# Patient Record
Sex: MALE | Race: WHITE | Employment: OTHER | URBAN - METROPOLITAN AREA
[De-identification: names, ages, dates, MRNs, and addresses within clinical notes are randomized per-mention and may not be internally consistent; named-entity substitution may affect disease eponyms.]

---

## 2019-02-09 LAB
AVERAGE GLUCOSE: NORMAL
AVERAGE GLUCOSE: NORMAL
HBA1C MFR BLD: 9.7 %
HBA1C MFR BLD: 9.7 %

## 2019-03-07 ENCOUNTER — OFFICE VISIT (OUTPATIENT)
Dept: SURGERY | Age: 49
End: 2019-03-07
Payer: COMMERCIAL

## 2019-03-07 VITALS
WEIGHT: 211 LBS | OXYGEN SATURATION: 97 % | BODY MASS INDEX: 33.12 KG/M2 | HEART RATE: 97 BPM | HEIGHT: 67 IN | TEMPERATURE: 98.6 F | SYSTOLIC BLOOD PRESSURE: 140 MMHG | DIASTOLIC BLOOD PRESSURE: 80 MMHG

## 2019-03-07 DIAGNOSIS — Z12.11 ENCOUNTER FOR SCREENING COLONOSCOPY: Primary | ICD-10-CM

## 2019-03-07 PROCEDURE — G8417 CALC BMI ABV UP PARAM F/U: HCPCS | Performed by: SURGERY

## 2019-03-07 PROCEDURE — 1036F TOBACCO NON-USER: CPT | Performed by: SURGERY

## 2019-03-07 PROCEDURE — 99203 OFFICE O/P NEW LOW 30 MIN: CPT | Performed by: SURGERY

## 2019-03-07 PROCEDURE — G8427 DOCREV CUR MEDS BY ELIG CLIN: HCPCS | Performed by: SURGERY

## 2019-03-07 PROCEDURE — G8484 FLU IMMUNIZE NO ADMIN: HCPCS | Performed by: SURGERY

## 2019-03-07 RX ORDER — ATORVASTATIN CALCIUM 40 MG/1
40 TABLET, FILM COATED ORAL DAILY
COMMUNITY
Start: 2018-12-19 | End: 2020-03-12 | Stop reason: SDUPTHER

## 2019-03-11 ENCOUNTER — TELEPHONE (OUTPATIENT)
Dept: SURGERY | Age: 49
End: 2019-03-11

## 2019-03-12 ENCOUNTER — TELEPHONE (OUTPATIENT)
Dept: SURGERY | Age: 49
End: 2019-03-12

## 2019-03-13 NOTE — TELEPHONE ENCOUNTER
Called patient and he stated that she needs to change his colonoscopy to 4/22/19.  He is r/s to [email or web address]. Electronically signed by Zeenat Bailon MA on 3/13/19 at 10:47 AM

## 2019-04-05 ENCOUNTER — TELEPHONE (OUTPATIENT)
Dept: SURGERY | Age: 49
End: 2019-04-05

## 2019-04-05 NOTE — TELEPHONE ENCOUNTER
[facility] called pt and scheduled F/U from colonoscopy scheduled 4/22/19; pt requested appt in [facility]; appt made 5/16/119 at 3:45 pm.  Electronically signed by Lino Bueno on 4/5/19 at 1:55 PM

## 2019-04-16 RX ORDER — SILDENAFIL CITRATE 20 MG/1
20 TABLET ORAL PRN
COMMUNITY
Start: 2018-03-29 | End: 2019-07-01 | Stop reason: SDUPTHER

## 2019-04-16 NOTE — PROGRESS NOTES
[facility] PRE-ADMISSION TESTING INSTRUCTIONS    The Preadmission Testing patient is instructed accordingly using the following criteria (check applicable):    ARRIVAL INSTRUCTIONS:  [x] Parking the day of Surgery is located in the Main Entrance lot. Upon entering the door, make an immediate right to the surgery reception desk    [x] Complimentary [facility] Parking is available Monday through Friday 6 am to 6 pm    [x] Bring photo ID and insurance card    [] Bring in a copy of Living will or Durable Power of  papers. [x] Please be sure to arrange for responsible adult to provide transportation to and from the hospital    [x] Please arrange for responsible adult to be with you for the 24 hour period post procedure due to having anesthesia      GENERAL INSTRUCTIONS:    [x] Nothing by mouth after midnight, including gum, candy, mints or water    [x] You may brush your teeth, but do not swallow any water    [] Take medications as instructed with 1-2 oz of water    [] Stop herbal supplements and vitamins 5 days prior to procedure    [] Follow preop dosing of blood thinners per physician instructions    [x] Take 1/2 dose of evening insulin, but no insulin after midnight    [x] No oral diabetic medications after midnight    [x] If diabetic and have low blood sugar or feel symptomatic, take 1-2oz apple juice only    [] Bring inhalers day of surgery    [] Bring C-PAP/ Bi-Pap day of surgery    [] Bring urine specimen day of surgery    [] Shower or bath with soap, lather and rinse well, AM of Surgery, no lotion, powders or creams to surgical site    [x] Follow bowel prep as instructed per surgeon    [] No tobacco products within 24 hours of surgery     [x] No alcohol or illegal drug use within 24 hours of surgery.     [x] Jewelry, body piercing's, eyeglasses, contact lenses and dentures are not permitted into surgery (bring cases)      [] Please do not wear any nail polish, make up or hair products on the day of surgery    [x] If not already done, you can expect a call from registration    [x] You can expect a call the business day prior to procedure to notify you if your arrival time changes    [x] If you receive a survey after surgery we would greatly appreciate your comments    [] Parent/guardian of a minor must accompany their child and remain on the premises  the entire time they are under our care     [] Pediatric patients may bring favorite toy, blanket or comfort item with them    [] A caregiver or family member must remain with the patient during their stay if they are mentally handicapped, have dementia, disoriented or unable to use a call light or would be a safety concern if left unattended    [x] Please notify surgeon if you develop any illness between now and time of surgery (cold, cough, sore throat, fever, nausea, vomiting) or any signs of infections  including skin, wounds, and dental.    [x]  The Outpatient Pharmacy is available to fill your prescription here on your day of surgery, ask your preop nurse for details    [] Other instructions  EDUCATIONAL MATERIALS PROVIDED:    [] PAT Preoperative Education Packet/Booklet     [] Medication List    [] Fluoroscopy Information Pamphlet    [] Transfusion bracelet applied with instructions    [] Joint replacement video reviewed    [] Shower with soap, lather and rinse well, and use CHG wipes provided the evening before surgery as instructed

## 2019-04-22 ENCOUNTER — ANESTHESIA EVENT (OUTPATIENT)
Dept: ENDOSCOPY | Age: 49
End: 2019-04-22
Payer: COMMERCIAL

## 2019-04-22 ENCOUNTER — HOSPITAL ENCOUNTER (OUTPATIENT)
Age: 49
Setting detail: OUTPATIENT SURGERY
Discharge: HOME | End: 2019-04-22
Attending: SURGERY | Admitting: SURGERY
Payer: COMMERCIAL

## 2019-04-22 ENCOUNTER — ANESTHESIA (OUTPATIENT)
Dept: ENDOSCOPY | Age: 49
End: 2019-04-22
Payer: COMMERCIAL

## 2019-04-22 VITALS
OXYGEN SATURATION: 98 % | SYSTOLIC BLOOD PRESSURE: 118 MMHG | TEMPERATURE: 97.2 F | DIASTOLIC BLOOD PRESSURE: 74 MMHG | HEIGHT: 67 IN | BODY MASS INDEX: 32.96 KG/M2 | RESPIRATION RATE: 20 BRPM | WEIGHT: 210 LBS | HEART RATE: 89 BPM

## 2019-04-22 VITALS
OXYGEN SATURATION: 99 % | RESPIRATION RATE: 16 BRPM | DIASTOLIC BLOOD PRESSURE: 64 MMHG | SYSTOLIC BLOOD PRESSURE: 100 MMHG

## 2019-04-22 LAB — METER GLUCOSE: 225 MG/DL (ref 74–99)

## 2019-04-22 PROCEDURE — 7100000010 HC PHASE II RECOVERY - FIRST 15 MIN: Performed by: SURGERY

## 2019-04-22 PROCEDURE — 7100000011 HC PHASE II RECOVERY - ADDTL 15 MIN: Performed by: SURGERY

## 2019-04-22 PROCEDURE — 3700000001 HC ADD 15 MINUTES (ANESTHESIA): Performed by: SURGERY

## 2019-04-22 PROCEDURE — 82962 GLUCOSE BLOOD TEST: CPT

## 2019-04-22 PROCEDURE — 3609010300 HC COLONOSCOPY W/BIOPSY SINGLE/MULTIPLE: Performed by: SURGERY

## 2019-04-22 PROCEDURE — 88305 TISSUE EXAM BY PATHOLOGIST: CPT

## 2019-04-22 PROCEDURE — 3700000000 HC ANESTHESIA ATTENDED CARE: Performed by: SURGERY

## 2019-04-22 PROCEDURE — 2580000003 HC RX 258: Performed by: NURSE ANESTHETIST, CERTIFIED REGISTERED

## 2019-04-22 PROCEDURE — 6360000002 HC RX W HCPCS: Performed by: NURSE ANESTHETIST, CERTIFIED REGISTERED

## 2019-04-22 PROCEDURE — 45380 COLONOSCOPY AND BIOPSY: CPT | Performed by: SURGERY

## 2019-04-22 PROCEDURE — 2709999900 HC NON-CHARGEABLE SUPPLY: Performed by: SURGERY

## 2019-04-22 RX ORDER — MIDAZOLAM HYDROCHLORIDE 1 MG/ML
INJECTION INTRAMUSCULAR; INTRAVENOUS PRN
Status: DISCONTINUED | OUTPATIENT
Start: 2019-04-22 | End: 2019-04-22 | Stop reason: SDUPTHER

## 2019-04-22 RX ORDER — PROPOFOL 10 MG/ML
INJECTION, EMULSION INTRAVENOUS PRN
Status: DISCONTINUED | OUTPATIENT
Start: 2019-04-22 | End: 2019-04-22 | Stop reason: SDUPTHER

## 2019-04-22 RX ORDER — SODIUM CHLORIDE 9 MG/ML
INJECTION, SOLUTION INTRAVENOUS CONTINUOUS PRN
Status: DISCONTINUED | OUTPATIENT
Start: 2019-04-22 | End: 2019-04-22 | Stop reason: SDUPTHER

## 2019-04-22 RX ORDER — SODIUM CHLORIDE 0.9 % (FLUSH) 0.9 %
10 SYRINGE (ML) INJECTION PRN
Status: DISCONTINUED | OUTPATIENT
Start: 2019-04-22 | End: 2019-04-22 | Stop reason: HOSPADM

## 2019-04-22 RX ORDER — SODIUM CHLORIDE 0.9 % (FLUSH) 0.9 %
10 SYRINGE (ML) INJECTION EVERY 12 HOURS SCHEDULED
Status: DISCONTINUED | OUTPATIENT
Start: 2019-04-22 | End: 2019-04-22 | Stop reason: HOSPADM

## 2019-04-22 RX ADMIN — PROPOFOL 260 MG: 10 INJECTION, EMULSION INTRAVENOUS at 07:54

## 2019-04-22 RX ADMIN — MIDAZOLAM HYDROCHLORIDE 2 MG: 1 INJECTION, SOLUTION INTRAMUSCULAR; INTRAVENOUS at 07:50

## 2019-04-22 RX ADMIN — SODIUM CHLORIDE: 9 INJECTION, SOLUTION INTRAVENOUS at 07:47

## 2019-04-22 ASSESSMENT — LIFESTYLE VARIABLES: SMOKING_STATUS: 0

## 2019-04-22 ASSESSMENT — PAIN - FUNCTIONAL ASSESSMENT: PAIN_FUNCTIONAL_ASSESSMENT: 0-10

## 2019-04-22 NOTE — H&P
[facility] Note     Assessment/Plan:        Diagnosis Orders   1. Encounter for screening colonoscopy        Schedule colonoscopy.            Return for Colonoscopy.             Chief Complaint   Patient presents with    New Patient       new patient colonoscopy, Family history of colon cancer         PCP: Talia Martinez MD     HPI: Fly Connolly is a 54 y.o. male who presents in consultation for family history of colon cancer. His mother had stage IV colon cancer 1995 at the age of 64. He has not had prior colonoscopy. He denies any current issues. He has no problems with diarrhea or constipation. He has not noticed any melena or hematochezia. He has no abdominal pain. He has not had any unintentional weight loss. He says he has lost about 15 pounds since he started his new job where he is more active. There is no family history of inflammatory bowel disease.        Past Medical History        Past Medical History:   Diagnosis Date    Hyperlipidemia      Hypertension      Type II or unspecified type diabetes mellitus without mention of complication, not stated as uncontrolled              Past Surgical History         Past Surgical History:   Procedure Laterality Date    ADENOIDECTOMY AND TYMPANOSTOMY TUBE PLACEMENT        HERNIA REPAIR         age 11 bilateral inguinal    VASECTOMY                Home Medications   Prior to Admission medications    Medication Sig Start Date End Date Taking? Authorizing Provider   atorvastatin (LIPITOR) 40 MG tablet   12/19/18   Yes Historical Provider, MD   glimepiride (AMARYL) 2 MG tablet Take 1 tablet by mouth Daily with supper. 12/16/13   Yes Nicol Myrick MD   B-D ULTRAFINE III SHORT PEN 31G X 8 MM MISC   11/14/13   Yes Historical Provider, MD   exenatide (BYDUREON) 2 MG SUSR injection Inject 1 Syringe into the skin once a week.  11/20/13   Yes Nicol Myrick MD   metFORMIN (GLUCOPHAGE) 1000 MG tablet Take 1,000 mg by mouth 2 times daily Cirrhosis Father      Substance Abuse Father      Diabetes Brother      Diabetes Brother              Review of Systems   All other systems reviewed and are negative.                  Objective:  Vitals       Vitals:     03/07/19 1347   BP: (!) 140/80   Site: Right Upper Arm   Position: Sitting   Cuff Size: Medium Adult   Pulse: 97   Temp: 98.6 °F (37 °C)   TempSrc: Oral   SpO2: 97%   Weight: 211 lb (95.7 kg)   Height: 5' 7\" (1.702 m)            Physical Exam   Constitutional: He is oriented to person, place, and time. No distress. HENT:   Head: Normocephalic and atraumatic. Eyes: Right eye exhibits no discharge. Left eye exhibits no discharge. Neck: No tracheal deviation present. Cardiovascular: Normal rate. Pulmonary/Chest: Effort normal. No respiratory distress. Abdominal: Soft. He exhibits no distension. There is no tenderness. There is no rebound and no guarding. Neurological: He is alert and oriented to person, place, and time. Skin: Skin is warm and dry. He is not diaphoretic.                     Flores Gardner MD       NOTE: This report, in part or full,may have been transcribed using voice recognition software. Every effort was made to ensure accuracy; however, inadvertent computerized transcription errors may be present.  Please excuse any transcriptional grammatical orspelling errors that may have escaped my editorial review.     CC: Talia Martinez MD

## 2019-04-22 NOTE — OP NOTE
Colonoscopy Op Note    DATE OF PROCEDURE: 4/22/2019    SURGEON: Raymond Dover MD    PREOPERATIVE DIAGNOSIS: High risk colorectal cancer screening with history of colon cancer in mother    POSTOPERATIVE DIAGNOSIS: Same, mild diverticlosis, poor prep due to eating food yesterday    OPERATION: Procedure(s):  COLONOSCOPY WITH BIOPSY    ANESTHESIA: Local monitored anesthesia. ESTIMATED BLOOD LOSS: nil     COMPLICATIONS: None. SPECIMENS:   ID Type Source Tests Collected by Time Destination   A : Terminal ileum bx Tissue Colon SURGICAL PATHOLOGY Raymond Dover MD 4/22/2019 0802        HISTORY: The patient is a 54y.o. year old male with history of above preop diagnosis. I recommended colonoscopy with possible biopsy or polypectomy and I explained the risk, benefits, expected outcome, and alternatives to the procedure. Risks included but are not limited to bleeding, infection, respiratory distress, hypotension, and perforation of the colon. The patient understands and is in agreement. PROCEDURE: The patient was given IV conscious sedation per anesthesia. The patient was given supplemental oxygen by nasal cannula. The colonoscope was inserted per rectum and advanced under direct vision to the cecum without difficulty, identified by appendix, valve. The prep was poor, particularly on the left so exam was suboptimal and a small mucosal lesion could have been missed. FINDINGS:    RUTH: grade I hemorrhoids    Terminal Ileum: pseudopolyps s/p representativ biopsy    Cecum/Ascending colon: normal    Transverse colon: normal    Descending/Sigmoid colon: mild diverticulosis, poor prep    Rectum/Anus: examined in normal and retroflexed positions and was grade I hemorrhoids    The colon was decompressed and the scope was removed. The withdraw time was approximately 12 minutes. The patient tolerated the procedure well. ASSESSMENT/PLAN:   1. Fiber diet  2.  Prep was poor due to admitting eating during the prep  3. Colorectal Cancer Screening - recommend repeat colonoscopy in 3 years. Sooner if issues/concerns.     Jacquelyn Fry MD  04/22/19  8:12 AM

## 2019-04-22 NOTE — ANESTHESIA POSTPROCEDURE EVALUATION
Department of Anesthesiology  Postprocedure Note    Patient: Rolando Ibarra  MRN: [id number]  YOB: 1970  Date of evaluation: 4/22/2019  Time:  12:00 PM     Procedure Summary     Date:  04/22/19 Room / Location:  [facility] 02 / [facility] ENDOSCOPY    Anesthesia Start:  5511 Anesthesia Stop:  0813    Procedure:  COLONOSCOPY WITH BIOPSY (N/A ) Diagnosis:  (SCREENING)    Surgeon:  Svaage Hayes MD Responsible Provider:  Bishop Maycol MD    Anesthesia Type:  MAC ASA Status:  3          Anesthesia Type: MAC    Nichole Phase I: Nichole Score: 10    Nichole Phase II: Nichole Score: 10    Last vitals: Reviewed and per EMR flowsheets.        Anesthesia Post Evaluation    Patient location during evaluation: PACU  Patient participation: complete - patient participated  Level of consciousness: awake and alert  Airway patency: patent  Nausea & Vomiting: no vomiting and no nausea  Complications: no  Cardiovascular status: blood pressure returned to baseline  Respiratory status: acceptable  Hydration status: euvolemic

## 2019-04-22 NOTE — ANESTHESIA PRE PROCEDURE
Department of Anesthesiology  Preprocedure Note       Name:  Michael Guerrero   Age:  54 y.o.  :  1970                                          MRN:  [id number]         Date:  2019      Surgeon: Nayla Choi):  Srinivas Shook MD    Procedure: COLORECTAL CANCER SCREENING, NOT HIGH RISK (N/A )    Medications prior to admission:   Prior to Admission medications    Medication Sig Start Date End Date Taking? Authorizing Provider   sildenafil (REVATIO) 20 MG tablet Take 20 mg by mouth as needed  3/29/18  Yes Historical Provider, MD   atorvastatin (LIPITOR) 40 MG tablet Take 40 mg by mouth daily  18  Yes Historical Provider, MD   glimepiride (AMARYL) 2 MG tablet Take 1 tablet by mouth Daily with supper. Patient taking differently: Take 2 mg by mouth 2 times daily  13  Yes Pilo Savage MD   metFORMIN (GLUCOPHAGE) 1000 MG tablet Take 1,000 mg by mouth 2 times daily (with meals). Yes Historical Provider, MD   Choline Fenofibrate (FENOFIBRIC ACID) 135 MG CPDR Take 135 mg by mouth daily    Yes Historical Provider, MD   insulin detemir (LEVEMIR FLEXPEN) 100 UNIT/ML injection Inject 50 Units into the skin nightly Take 1/2 dose evening 2019. Yes Historical Provider, MD   lisinopril (PRINIVIL;ZESTRIL) 5 MG tablet Take 5 mg by mouth daily. Yes Historical Provider, MD [place] ULTRAFINE III SHORT PEN 31G X 8 MM MISC  13   Historical Provider, MD       Current medications:    Current Facility-Administered Medications   Medication Dose Route Frequency Provider Last Rate Last Dose    sodium chloride flush 0.9 % injection 10 mL  10 mL Intravenous 2 times per day Srinivas Shook MD        sodium chloride flush 0.9 % injection 10 mL  10 mL Intravenous PRN Srinivas Shook MD           Allergies:  No Known Allergies    Problem List:  There is no problem list on file for this patient.       Past Medical History:        Diagnosis Date    Encounter for screening colonoscopy 2019    Hyperlipidemia  Hypertension     Type II or unspecified type diabetes mellitus without mention of complication, not stated as uncontrolled        Past Surgical History:        Procedure Laterality Date    ADENOIDECTOMY AND TYMPANOSTOMY TUBE PLACEMENT      HERNIA REPAIR      age 11 bilateral inguinal    VASECTOMY         Social History:    Social History     Tobacco Use    Smoking status: Former Smoker     Types: Cigarettes     Start date: 12/12/2000    Smokeless tobacco: Former User    Tobacco comment: quit 2001   Substance Use Topics    Alcohol use: Yes     Comment: social                                Counseling given: Not Answered  Comment: quit 2001      Vital Signs (Current):   Vitals:    04/16/19 1433 04/22/19 0708   BP:  131/77   Pulse:  88   Resp:  18   Temp:  98.2 °F (36.8 °C)   TempSrc:  Temporal   SpO2:  95%   Weight: 208 lb (94.3 kg) 210 lb (95.3 kg)   Height: 5' 7\" (1.702 m) 5' 7\" (1.702 m)                                              BP Readings from Last 3 Encounters:   04/22/19 131/77   03/07/19 (!) 140/80   11/20/13 (!) 129/93       NPO Status: Time of last liquid consumption: 2030                        Time of last solid consumption: 1230                        Date of last liquid consumption: 04/21/19                        Date of last solid food consumption: 04/21/19    BMI:   Wt Readings from Last 3 Encounters:   04/22/19 210 lb (95.3 kg)   03/07/19 211 lb (95.7 kg)   11/20/13 227 lb (103 kg)     Body mass index is 32.89 kg/m². CBC: No results found for: WBC, RBC, HGB, HCT, MCV, RDW, PLT    CMP:   Lab Results   Component Value Date    GLUCOSE 127 11/20/2013       POC Tests: No results for input(s): POCGLU, POCNA, POCK, POCCL, POCBUN, POCHEMO, POCHCT in the last 72 hours.     Coags: No results found for: PROTIME, INR, APTT    HCG (If Applicable): No results found for: PREGTESTUR, PREGSERUM, HCG, HCGQUANT     ABGs: No results found for: PHART, PO2ART, GXD3TJH, WIC3KYP, BEART, Z4BBNIYM     Type & Screen (If Applicable):  No results found for: [facility]    Anesthesia Evaluation  Patient summary reviewed and Nursing notes reviewed no history of anesthetic complications:   Airway: Mallampati: II  TM distance: >3 FB   Neck ROM: full  Mouth opening: > = 3 FB Dental: normal exam         Pulmonary:Negative Pulmonary ROS breath sounds clear to auscultation      (-) not a current smoker                           Cardiovascular:  Exercise tolerance: good (>4 METS),   (+) hypertension:, hyperlipidemia        Rhythm: regular  Rate: normal           Beta Blocker:  Not on Beta Blocker         Neuro/Psych:                ROS comment: Neck radiculopathy GI/Hepatic/Renal:   (+) bowel prep,           Endo/Other:    (+) Diabetes, . Abdominal:           Vascular: negative vascular ROS. Anesthesia Plan      MAC     ASA 3             Anesthetic plan and risks discussed with patient and child/children.                       Era Thrasher MD   4/22/2019

## 2019-06-08 ENCOUNTER — HOSPITAL ENCOUNTER (OUTPATIENT)
Age: 49
Discharge: HOME | End: 2019-06-10
Payer: COMMERCIAL

## 2019-06-08 LAB
ALBUMIN SERPL-MCNC: 4.5 G/DL (ref 3.5–5.2)
ALP BLD-CCNC: 108 U/L (ref 40–129)
ALT SERPL-CCNC: 36 U/L (ref 0–40)
ANION GAP SERPL CALCULATED.3IONS-SCNC: 12 MMOL/L (ref 7–16)
AST SERPL-CCNC: 24 U/L (ref 0–39)
BACTERIA: NORMAL /HPF
BASOPHILS ABSOLUTE: 0.04 E9/L (ref 0–0.2)
BASOPHILS RELATIVE PERCENT: 0.7 % (ref 0–2)
BILIRUB SERPL-MCNC: 0.3 MG/DL (ref 0–1.2)
BILIRUBIN URINE: NEGATIVE
BLOOD, URINE: NEGATIVE
BUN BLDV-MCNC: 18 MG/DL (ref 6–20)
CALCIUM SERPL-MCNC: 10.1 MG/DL (ref 8.6–10.2)
CHLORIDE BLD-SCNC: 98 MMOL/L (ref 98–107)
CHOLESTEROL, TOTAL: 173 MG/DL (ref 0–199)
CLARITY: CLEAR
CO2: 25 MMOL/L (ref 22–29)
COLOR: YELLOW
CREAT SERPL-MCNC: 0.7 MG/DL (ref 0.7–1.2)
CREATININE URINE: 82 MG/DL (ref 40–278)
EOSINOPHILS ABSOLUTE: 0.16 E9/L (ref 0.05–0.5)
EOSINOPHILS RELATIVE PERCENT: 2.7 % (ref 0–6)
GFR AFRICAN AMERICAN: >60
GFR NON-AFRICAN AMERICAN: >60 ML/MIN/1.73
GLUCOSE BLD-MCNC: 263 MG/DL (ref 74–99)
GLUCOSE URINE: >=1000 MG/DL
HBA1C MFR BLD: 9.3 % (ref 4–5.6)
HCT VFR BLD CALC: 43.9 % (ref 37–54)
HDLC SERPL-MCNC: 34 MG/DL
HEMOGLOBIN: 14.2 G/DL (ref 12.5–16.5)
IMMATURE GRANULOCYTES #: 0.02 E9/L
IMMATURE GRANULOCYTES %: 0.3 % (ref 0–5)
KETONES, URINE: NEGATIVE MG/DL
LDL CHOLESTEROL CALCULATED: 69 MG/DL (ref 0–99)
LEUKOCYTE ESTERASE, URINE: NEGATIVE
LYMPHOCYTES ABSOLUTE: 2.39 E9/L (ref 1.5–4)
LYMPHOCYTES RELATIVE PERCENT: 39.8 % (ref 20–42)
MCH RBC QN AUTO: 28.7 PG (ref 26–35)
MCHC RBC AUTO-ENTMCNC: 32.3 % (ref 32–34.5)
MCV RBC AUTO: 88.9 FL (ref 80–99.9)
MICROALBUMIN UR-MCNC: 154.2 MG/L
MICROALBUMIN/CREAT UR-RTO: 188 (ref 0–30)
MONOCYTES ABSOLUTE: 0.47 E9/L (ref 0.1–0.95)
MONOCYTES RELATIVE PERCENT: 7.8 % (ref 2–12)
NEUTROPHILS ABSOLUTE: 2.93 E9/L (ref 1.8–7.3)
NEUTROPHILS RELATIVE PERCENT: 48.7 % (ref 43–80)
NITRITE, URINE: NEGATIVE
PDW BLD-RTO: 12.7 FL (ref 11.5–15)
PH UA: 5.5 (ref 5–9)
PLATELET # BLD: 218 E9/L (ref 130–450)
PMV BLD AUTO: 11.5 FL (ref 7–12)
POTASSIUM SERPL-SCNC: 5.5 MMOL/L (ref 3.5–5)
PROTEIN UA: 30 MG/DL
RBC # BLD: 4.94 E12/L (ref 3.8–5.8)
RBC UA: NORMAL /HPF (ref 0–2)
SODIUM BLD-SCNC: 135 MMOL/L (ref 132–146)
SPECIFIC GRAVITY UA: 1.02 (ref 1–1.03)
TOTAL CK: 311 U/L (ref 20–200)
TOTAL PROTEIN: 8.2 G/DL (ref 6.4–8.3)
TRIGL SERPL-MCNC: 348 MG/DL (ref 0–149)
TSH SERPL DL<=0.05 MIU/L-ACNC: 3.22 UIU/ML (ref 0.27–4.2)
UROBILINOGEN, URINE: 0.2 E.U./DL
VLDLC SERPL CALC-MCNC: 70 MG/DL
WBC # BLD: 6 E9/L (ref 4.5–11.5)
WBC UA: NORMAL /HPF (ref 0–5)

## 2019-06-08 PROCEDURE — 80061 LIPID PANEL: CPT

## 2019-06-08 PROCEDURE — 82570 ASSAY OF URINE CREATININE: CPT

## 2019-06-08 PROCEDURE — 82044 UR ALBUMIN SEMIQUANTITATIVE: CPT

## 2019-06-08 PROCEDURE — 82550 ASSAY OF CK (CPK): CPT

## 2019-06-08 PROCEDURE — 36415 COLL VENOUS BLD VENIPUNCTURE: CPT

## 2019-06-08 PROCEDURE — 84443 ASSAY THYROID STIM HORMONE: CPT

## 2019-06-08 PROCEDURE — 80053 COMPREHEN METABOLIC PANEL: CPT

## 2019-06-08 PROCEDURE — 83036 HEMOGLOBIN GLYCOSYLATED A1C: CPT

## 2019-06-08 PROCEDURE — 85025 COMPLETE CBC W/AUTO DIFF WBC: CPT

## 2019-06-08 PROCEDURE — 81001 URINALYSIS AUTO W/SCOPE: CPT

## 2019-06-10 DIAGNOSIS — E87.5 HYPERKALEMIA: Primary | ICD-10-CM

## 2019-06-11 DIAGNOSIS — E11.8 TYPE 2 DIABETES MELLITUS WITH COMPLICATION, UNSPECIFIED WHETHER LONG TERM INSULIN USE: ICD-10-CM

## 2019-06-11 RX ORDER — GLIMEPIRIDE 2 MG/1
2 TABLET ORAL 2 TIMES DAILY
Qty: 180 TABLET | Refills: 1 | OUTPATIENT
Start: 2019-06-11

## 2019-06-14 RX ORDER — GLIMEPIRIDE 2 MG/1
2 TABLET ORAL 2 TIMES DAILY
Qty: 180 TABLET | Refills: 0 | Status: SHIPPED
Start: 2019-06-14 | End: 2020-03-12

## 2019-06-14 NOTE — TELEPHONE ENCOUNTER
Spoke with patient he is very unhappy with the care he has been getting from Dr Shima Waller office, he can never get a hold of anyone at the office. Patient states that basically he does not have an endocrinologist at this point. asking for a refill of glimepiride, pt is currently out. States if you can not fill he just wont take.  Will discuss with you at next appointment about a new endo referral

## 2019-06-29 ENCOUNTER — HOSPITAL ENCOUNTER (OUTPATIENT)
Age: 49
Discharge: HOME | End: 2019-07-01
Payer: COMMERCIAL

## 2019-06-29 DIAGNOSIS — E87.5 HYPERKALEMIA: ICD-10-CM

## 2019-06-29 LAB — POTASSIUM SERPL-SCNC: 5.5 MMOL/L (ref 3.5–5)

## 2019-06-29 PROCEDURE — 84132 ASSAY OF SERUM POTASSIUM: CPT

## 2019-06-29 PROCEDURE — 36415 COLL VENOUS BLD VENIPUNCTURE: CPT

## 2019-07-01 ENCOUNTER — OFFICE VISIT (OUTPATIENT)
Dept: PRIMARY CARE CLINIC | Age: 49
End: 2019-07-01
Payer: COMMERCIAL

## 2019-07-01 VITALS
SYSTOLIC BLOOD PRESSURE: 128 MMHG | OXYGEN SATURATION: 98 % | BODY MASS INDEX: 33.05 KG/M2 | HEART RATE: 58 BPM | DIASTOLIC BLOOD PRESSURE: 72 MMHG | WEIGHT: 211 LBS

## 2019-07-01 VITALS
HEART RATE: 93 BPM | BODY MASS INDEX: 32.69 KG/M2 | TEMPERATURE: 97.1 F | SYSTOLIC BLOOD PRESSURE: 150 MMHG | OXYGEN SATURATION: 98 % | WEIGHT: 215 LBS | DIASTOLIC BLOOD PRESSURE: 84 MMHG

## 2019-07-01 DIAGNOSIS — E11.65 TYPE 2 DIABETES MELLITUS WITH HYPERGLYCEMIA, WITH LONG-TERM CURRENT USE OF INSULIN (HCC): Primary | ICD-10-CM

## 2019-07-01 DIAGNOSIS — Z79.4 TYPE 2 DIABETES MELLITUS WITH HYPERGLYCEMIA, WITH LONG-TERM CURRENT USE OF INSULIN (HCC): Primary | ICD-10-CM

## 2019-07-01 DIAGNOSIS — R80.9 PROTEINURIA, UNSPECIFIED TYPE: ICD-10-CM

## 2019-07-01 DIAGNOSIS — Z00.00 HEALTH MAINTENANCE EXAMINATION: ICD-10-CM

## 2019-07-01 DIAGNOSIS — I10 ESSENTIAL HYPERTENSION: ICD-10-CM

## 2019-07-01 DIAGNOSIS — N52.9 MALE ERECTILE DISORDER: ICD-10-CM

## 2019-07-01 DIAGNOSIS — E87.5 HYPERKALEMIA: ICD-10-CM

## 2019-07-01 DIAGNOSIS — E78.2 MIXED HYPERLIPIDEMIA: ICD-10-CM

## 2019-07-01 PROCEDURE — 99215 OFFICE O/P EST HI 40 MIN: CPT | Performed by: FAMILY MEDICINE

## 2019-07-01 RX ORDER — AMOXICILLIN 500 MG
CAPSULE ORAL 2 TIMES DAILY
COMMUNITY
End: 2022-06-09 | Stop reason: ALTCHOICE

## 2019-07-01 RX ORDER — SILDENAFIL CITRATE 20 MG/1
TABLET ORAL
Qty: 30 TABLET | Refills: 3 | Status: SHIPPED
Start: 2019-07-01 | End: 2020-11-11 | Stop reason: SDUPTHER

## 2019-07-01 NOTE — PROGRESS NOTES
19  Valerie [place] : 1970 Sex: male  Age: 54 y.o. Chief Complaint   Patient presents with    Discuss Labs    Diabetes       HPI  HPI:    Presents for follow-up. Feeling well. Asking for refill on sildenafil. He has not been compliant with following with endocrinology. Counseled on the importance of this. He says he will do so. He had a colonoscopy 2019 that showed mild diverticulosis but poor prep it was instructed by the surgeon to repeat in 3 years. He is asymptomatic. Hemoglobin normalized. He saw the nephrologist in the past and he stated that it was a waste of time and declines follow-up. Microalbumin to creatinine ratio fluctuates. Saw eye doctor last week and this was normal.    Blood work reviewed. Potassium was elevated at 5.5 and repeat was the same. We have had issues with the labs with high potassium and he is asymptomatic but discussed the ramifications if this is true, possible need for adjustment of ACE inhibitor etc.  Proper hydration reviewed. Low potassium intake reviewed    CMP otherwise normal, glucose 263, greater than thousand glucose in the urine, triglycerides 348, HDL 34, LDL 69, microalbumin 154, microalbumin to creatinine ratio 188, total . Admits to a lot of yard work etc.  Denies chest pain shortness of breath or muscle weakness. Hemoglobin A1c elevated at 9.3. TSH 3.2. His microalbumin to creatinine ratio was 26 on , 24 on , 132 on  and 32 on       Review of Systems  ROS:  Const: Denies chills, fever, malaise and sweats. Eyes: Denies discharge, pain, redness and visual disturbance. ENMT: Denies earaches, other ear symptoms. Denies nasal or sinus symptoms other than stated  above. Denies mouth and tongue lesions and sore throat.   CV: Denies chest discomfort, pain; diaphoresis, dizziness, edema, lightheadedness, orthopnea,  palpitations, syncope and near syncopal episode or any exertional symptoms  Resp: Denies cough, hemoptysis, pleuritic pain, SOB, sputum production and wheezing. GI: Denies abdominal pain, change in bowel habits, hematochezia, melena, nausea and vomiting. : Denies urinary symptoms including dysuria , urgency, frequency or hematuria. Musculo: Denies musculoskeletal symptoms. Skin: Denies bruising and rash. Neuro: Denies headache, numbness, stiff neck, tingling and focal weakness slurred speech or facial  droop  Hema/Lymph: Denies bleeding/bruising tendency and enlarged lymph nodes        Current Outpatient Medications:     aspirin 81 MG tablet, Take 81 mg by mouth daily, Disp: , Rfl:     Omega-3 Fatty Acids (FISH OIL) 1200 MG CAPS, Take by mouth 2 times daily, Disp: , Rfl:     sildenafil (REVATIO) 20 MG tablet, 1-2 po 1/2-4hrs prior to sexual activity, max one dose per day., Disp: 30 tablet, Rfl: 3    glimepiride (AMARYL) 2 MG tablet, Take 1 tablet by mouth 2 times daily, Disp: 180 tablet, Rfl: 0    atorvastatin (LIPITOR) 40 MG tablet, Take 40 mg by mouth daily , Disp: , Rfl:     B-D ULTRAFINE III SHORT PEN 31G X 8 MM MISC, , Disp: , Rfl:     metFORMIN (GLUCOPHAGE) 1000 MG tablet, Take 1,000 mg by mouth 2 times daily (with meals). , Disp: , Rfl:     Choline Fenofibrate (FENOFIBRIC ACID) 135 MG CPDR, Take 135 mg by mouth daily , Disp: , Rfl:     insulin detemir (LEVEMIR FLEXPEN) 100 UNIT/ML injection, Inject 80 Units into the skin nightly , Disp: , Rfl:     lisinopril (PRINIVIL;ZESTRIL) 5 MG tablet, Take 5 mg by mouth daily. , Disp: , Rfl:   No Known Allergies    Past Medical History:   Diagnosis Date    Encounter for screening colonoscopy 4/22/2019    Headache     migraine    Hyperlipidemia     Hypertension     Type 2 diabetes mellitus without complication (HCC)     insulin dependent    Type II or unspecified type diabetes mellitus without mention of complication, not stated as uncontrolled      Past Surgical History:   Procedure Laterality Date    ADENOIDECTOMY AND TYMPANOSTOMY TUBE

## 2019-07-02 NOTE — ASSESSMENT & PLAN NOTE
Lifestyle modification reviewed. risk of hyperlipidemia reviewed. Other treatment  options reviewed. Consider switching fenofibrate to gemfibrozil. Declines change in  therapy. Plan some medications reviewed. Risks reviewed including cardio/neurovascular and pancreatitis. Has not taken his  Vascepa for over a year. He should discuss with Dr. Kavita Zaragoza as well. Declines repeat before 3 months.

## 2019-07-02 NOTE — ASSESSMENT & PLAN NOTE
counseled. watch BS ambulatory. Parameters given. Call if not in range. ER if  dangerous numbers. micro-and macrovascular complications reviewed, hyper  hypoglycemic precautions reviewed. Importance of at least daily foot exams and  yearly eye exams reviewed. Continue per Dr. Coretta Kayser. He has been noncompliant with this and needs to do so ASAP. Risk of dka reviewed. Very concerning numbers. Compliance of medications reviewed and importance of following with Dr. Coretta Kayser ASAP emphasized.   Risks of severe diabetic complications reviewed

## 2019-07-02 NOTE — ASSESSMENT & PLAN NOTE
Microalbumin to creatinine ratio was 188 on 7/19. His microalbumin to creatinine ratio was 26 on 2/19, 24 on 2/18, 132 on 12/16 and 32 on 9/16    Saw [facility] nephrology in the past.  States it was a waste of time and declines follow-up. Proper hydration reviewed. Avoid nephrotoxic agents. Sugar control emphasized.

## 2019-07-31 PROBLEM — Z00.00 HEALTH MAINTENANCE EXAMINATION: Status: RESOLVED | Noted: 2019-07-01 | Resolved: 2019-07-31

## 2020-02-10 ENCOUNTER — TELEPHONE (OUTPATIENT)
Dept: ADMINISTRATIVE | Age: 50
End: 2020-02-10

## 2020-02-10 RX ORDER — LISINOPRIL 5 MG/1
5 TABLET ORAL DAILY
Qty: 30 TABLET | Refills: 1 | Status: CANCELLED | OUTPATIENT
Start: 2020-02-10

## 2020-02-10 NOTE — TELEPHONE ENCOUNTER
Patient has appt in march only has week's worth of Lisinopril left. Please call in refill to [address]  Call patient and advise refill called in,.  Thanks

## 2020-02-10 NOTE — TELEPHONE ENCOUNTER
I cannot send, say invalid prescriber details. Please verify and correct.   Also, please change date on labs to march and get labs prior to fu, sooner prn

## 2020-02-11 RX ORDER — LISINOPRIL 5 MG/1
5 TABLET ORAL DAILY
Qty: 30 TABLET | Refills: 1 | Status: SHIPPED
Start: 2020-02-11 | End: 2020-03-12 | Stop reason: SDUPTHER

## 2020-02-22 ENCOUNTER — HOSPITAL ENCOUNTER (OUTPATIENT)
Age: 50
Discharge: HOME | End: 2020-02-24
Payer: COMMERCIAL

## 2020-02-22 LAB
ALBUMIN SERPL-MCNC: 4.7 G/DL (ref 3.5–5.2)
ALP BLD-CCNC: 97 U/L (ref 40–129)
ALT SERPL-CCNC: 29 U/L (ref 0–40)
ANION GAP SERPL CALCULATED.3IONS-SCNC: 14 MMOL/L (ref 7–16)
AST SERPL-CCNC: 19 U/L (ref 0–39)
BASOPHILS ABSOLUTE: 0.05 E9/L (ref 0–0.2)
BASOPHILS RELATIVE PERCENT: 0.9 % (ref 0–2)
BILIRUB SERPL-MCNC: 0.3 MG/DL (ref 0–1.2)
BILIRUBIN URINE: NEGATIVE
BLOOD, URINE: NEGATIVE
BUN BLDV-MCNC: 14 MG/DL (ref 6–20)
CALCIUM SERPL-MCNC: 10.6 MG/DL (ref 8.6–10.2)
CHLORIDE BLD-SCNC: 97 MMOL/L (ref 98–107)
CHOLESTEROL, TOTAL: 167 MG/DL (ref 0–199)
CLARITY: CLEAR
CO2: 24 MMOL/L (ref 22–29)
COLOR: YELLOW
CREAT SERPL-MCNC: 0.7 MG/DL (ref 0.7–1.2)
CREATININE URINE: 61 MG/DL (ref 40–278)
EOSINOPHILS ABSOLUTE: 0.17 E9/L (ref 0.05–0.5)
EOSINOPHILS RELATIVE PERCENT: 3.1 % (ref 0–6)
GFR AFRICAN AMERICAN: >60
GFR NON-AFRICAN AMERICAN: >60 ML/MIN/1.73
GLUCOSE BLD-MCNC: 314 MG/DL (ref 74–99)
GLUCOSE URINE: >=1000 MG/DL
HBA1C MFR BLD: 12.7 % (ref 4–5.6)
HCT VFR BLD CALC: 45.5 % (ref 37–54)
HDLC SERPL-MCNC: 33 MG/DL
HEMOGLOBIN: 14.3 G/DL (ref 12.5–16.5)
IMMATURE GRANULOCYTES #: 0.01 E9/L
IMMATURE GRANULOCYTES %: 0.2 % (ref 0–5)
KETONES, URINE: NEGATIVE MG/DL
LDL CHOLESTEROL CALCULATED: 73 MG/DL (ref 0–99)
LEUKOCYTE ESTERASE, URINE: NEGATIVE
LYMPHOCYTES ABSOLUTE: 2.27 E9/L (ref 1.5–4)
LYMPHOCYTES RELATIVE PERCENT: 41.7 % (ref 20–42)
MCH RBC QN AUTO: 28.4 PG (ref 26–35)
MCHC RBC AUTO-ENTMCNC: 31.4 % (ref 32–34.5)
MCV RBC AUTO: 90.3 FL (ref 80–99.9)
MICROALBUMIN UR-MCNC: 75.5 MG/L
MICROALBUMIN/CREAT UR-RTO: 123.8 (ref 0–30)
MONOCYTES ABSOLUTE: 0.47 E9/L (ref 0.1–0.95)
MONOCYTES RELATIVE PERCENT: 8.6 % (ref 2–12)
NEUTROPHILS ABSOLUTE: 2.47 E9/L (ref 1.8–7.3)
NEUTROPHILS RELATIVE PERCENT: 45.5 % (ref 43–80)
NITRITE, URINE: NEGATIVE
PDW BLD-RTO: 13 FL (ref 11.5–15)
PH UA: 5.5 (ref 5–9)
PLATELET # BLD: 204 E9/L (ref 130–450)
PMV BLD AUTO: 11.3 FL (ref 7–12)
POTASSIUM SERPL-SCNC: 5.5 MMOL/L (ref 3.5–5)
PROTEIN UA: NEGATIVE MG/DL
RBC # BLD: 5.04 E12/L (ref 3.8–5.8)
SODIUM BLD-SCNC: 135 MMOL/L (ref 132–146)
SPECIFIC GRAVITY UA: 1.02 (ref 1–1.03)
TOTAL CK: 253 U/L (ref 20–200)
TOTAL PROTEIN: 7.9 G/DL (ref 6.4–8.3)
TRIGL SERPL-MCNC: 307 MG/DL (ref 0–149)
TSH SERPL DL<=0.05 MIU/L-ACNC: 2.83 UIU/ML (ref 0.27–4.2)
UROBILINOGEN, URINE: 0.2 E.U./DL
VLDLC SERPL CALC-MCNC: 61 MG/DL
WBC # BLD: 5.4 E9/L (ref 4.5–11.5)

## 2020-02-22 PROCEDURE — 82550 ASSAY OF CK (CPK): CPT

## 2020-02-22 PROCEDURE — 36415 COLL VENOUS BLD VENIPUNCTURE: CPT

## 2020-02-22 PROCEDURE — 82044 UR ALBUMIN SEMIQUANTITATIVE: CPT

## 2020-02-22 PROCEDURE — 85025 COMPLETE CBC W/AUTO DIFF WBC: CPT

## 2020-02-22 PROCEDURE — 83036 HEMOGLOBIN GLYCOSYLATED A1C: CPT

## 2020-02-22 PROCEDURE — 80053 COMPREHEN METABOLIC PANEL: CPT

## 2020-02-22 PROCEDURE — 80061 LIPID PANEL: CPT

## 2020-02-22 PROCEDURE — 81003 URINALYSIS AUTO W/O SCOPE: CPT

## 2020-02-22 PROCEDURE — 84443 ASSAY THYROID STIM HORMONE: CPT

## 2020-02-22 PROCEDURE — 82570 ASSAY OF URINE CREATININE: CPT

## 2020-03-11 ENCOUNTER — HOSPITAL ENCOUNTER (OUTPATIENT)
Age: 50
Discharge: HOME | End: 2020-03-13
Payer: COMMERCIAL

## 2020-03-11 LAB
CALCIUM SERPL-MCNC: 9.5 MG/DL (ref 8.6–10.2)
POTASSIUM SERPL-SCNC: 4.5 MMOL/L (ref 3.5–5)

## 2020-03-11 PROCEDURE — 82310 ASSAY OF CALCIUM: CPT

## 2020-03-11 PROCEDURE — 36415 COLL VENOUS BLD VENIPUNCTURE: CPT

## 2020-03-11 PROCEDURE — 84132 ASSAY OF SERUM POTASSIUM: CPT

## 2020-03-12 ENCOUNTER — OFFICE VISIT (OUTPATIENT)
Dept: PRIMARY CARE CLINIC | Age: 50
End: 2020-03-12
Payer: COMMERCIAL

## 2020-03-12 VITALS
TEMPERATURE: 97.8 F | BODY MASS INDEX: 31.32 KG/M2 | SYSTOLIC BLOOD PRESSURE: 128 MMHG | OXYGEN SATURATION: 98 % | DIASTOLIC BLOOD PRESSURE: 64 MMHG | HEART RATE: 89 BPM | WEIGHT: 200 LBS

## 2020-03-12 PROBLEM — Z12.5 PROSTATE CANCER SCREENING: Status: ACTIVE | Noted: 2020-03-12

## 2020-03-12 PROBLEM — E83.52 HYPERCALCEMIA: Status: ACTIVE | Noted: 2020-03-12

## 2020-03-12 PROCEDURE — G8417 CALC BMI ABV UP PARAM F/U: HCPCS | Performed by: FAMILY MEDICINE

## 2020-03-12 PROCEDURE — 2022F DILAT RTA XM EVC RTNOPTHY: CPT | Performed by: FAMILY MEDICINE

## 2020-03-12 PROCEDURE — 3046F HEMOGLOBIN A1C LEVEL >9.0%: CPT | Performed by: FAMILY MEDICINE

## 2020-03-12 PROCEDURE — 99215 OFFICE O/P EST HI 40 MIN: CPT | Performed by: FAMILY MEDICINE

## 2020-03-12 PROCEDURE — 1036F TOBACCO NON-USER: CPT | Performed by: FAMILY MEDICINE

## 2020-03-12 PROCEDURE — G8484 FLU IMMUNIZE NO ADMIN: HCPCS | Performed by: FAMILY MEDICINE

## 2020-03-12 PROCEDURE — G8427 DOCREV CUR MEDS BY ELIG CLIN: HCPCS | Performed by: FAMILY MEDICINE

## 2020-03-12 RX ORDER — ATORVASTATIN CALCIUM 40 MG/1
40 TABLET, FILM COATED ORAL DAILY
Qty: 90 TABLET | Refills: 3 | Status: SHIPPED
Start: 2020-03-12 | End: 2021-04-09 | Stop reason: SDUPTHER

## 2020-03-12 RX ORDER — LISINOPRIL 5 MG/1
5 TABLET ORAL DAILY
Qty: 90 TABLET | Refills: 3 | Status: SHIPPED
Start: 2020-03-12 | End: 2020-04-08 | Stop reason: SDUPTHER

## 2020-03-12 ASSESSMENT — PATIENT HEALTH QUESTIONNAIRE - PHQ9
1. LITTLE INTEREST OR PLEASURE IN DOING THINGS: 0
SUM OF ALL RESPONSES TO PHQ9 QUESTIONS 1 & 2: 0
SUM OF ALL RESPONSES TO PHQ QUESTIONS 1-9: 0
SUM OF ALL RESPONSES TO PHQ QUESTIONS 1-9: 0
2. FEELING DOWN, DEPRESSED OR HOPELESS: 0

## 2020-03-12 NOTE — PROGRESS NOTES
cough, hemoptysis, pleuritic pain, SOB, sputum production and wheezing. GI: Denies abdominal pain, change in bowel habits, hematochezia, melena, nausea and vomiting. : Denies urinary symptoms including dysuria , urgency, frequency or hematuria. Musculo: Denies musculoskeletal symptoms. Skin: Denies bruising and rash.   Neuro: Denies headache, numbness, stiff neck, tingling and focal weakness slurred speech or facial  droop  Hema/Lymph: Denies bleeding/bruising tendency and enlarged lymph nodes        Current Outpatient Medications:     lisinopril (PRINIVIL;ZESTRIL) 5 MG tablet, Take 1 tablet by mouth daily, Disp: 90 tablet, Rfl: 3    metFORMIN (GLUCOPHAGE) 1000 MG tablet, Take 1 tablet by mouth 2 times daily (with meals), Disp: 180 tablet, Rfl: 3    atorvastatin (LIPITOR) 40 MG tablet, Take 1 tablet by mouth daily, Disp: 90 tablet, Rfl: 3    SITagliptin (JANUVIA) 100 MG tablet, Take 1 tablet by mouth daily, Disp: 30 tablet, Rfl: 3    aspirin 81 MG tablet, Take 81 mg by mouth daily, Disp: , Rfl:     Omega-3 Fatty Acids (FISH OIL) 1200 MG CAPS, Take by mouth 2 times daily, Disp: , Rfl:     sildenafil (REVATIO) 20 MG tablet, 1-2 po 1/2-4hrs prior to sexual activity, max one dose per day., Disp: 30 tablet, Rfl: 3    Choline Fenofibrate (FENOFIBRIC ACID) 135 MG CPDR, Take 135 mg by mouth daily , Disp: , Rfl:   No Known Allergies    Past Medical History:   Diagnosis Date    Encounter for screening colonoscopy 4/22/2019    Erectile dysfunction     Headache     migraine    Hyperlipidemia     Hypertension     Isolated proteinuria     Type 2 diabetes mellitus without complication (HCC)     insulin dependent    Type II or unspecified type diabetes mellitus without mention of complication, not stated as uncontrolled      Past Surgical History:   Procedure Laterality Date    ADENOIDECTOMY AND TYMPANOSTOMY TUBE PLACEMENT      COLONOSCOPY N/A 4/22/2019    COLONOSCOPY WITH BIOPSY performed by Shaka Call MD at [address]      age 11 bilateral inguinal    TONSILLECTOMY AND ADENOIDECTOMY      VASECTOMY       Family History   Problem Relation Age of Onset    Thyroid Disease Mother     Diabetes Mother     High Blood Pressure Mother     Arthritis Mother     Cancer Mother     High Cholesterol Mother     Cirrhosis Father     Substance Abuse Father     Diabetes Brother     Diabetes Brother      Social History     Tobacco Use    Smoking status: Former Smoker     Types: Cigarettes     Start date: 2000    Smokeless tobacco: Former User    Tobacco comment: quit    Substance Use Topics    Alcohol use: Yes     Comment: social    Drug use: No      Social History     Social History Narrative    PMH:    Problem List: Essential hypertension, Essential hypertension, Type 2 diabetes mellitus    Health Maintenance:    Influenza Vaccination - (2016)    Medical Problems:    Insulin Dependent Diabetes - DX . Hypertension, Hyperlipidemia    Migraine - neg eval in past    Surgical Hx:    Hernia repair - age 11    T & A, Tympanostomy Tube Insertion            FH:    Father:    . (Hx)    Mother:    . (Hx)    Son 5:    . (Hx)    Grandson 5:    . (Hx)    Dad -  68 cirrhosis, heavy drinker. Mom - thyroid, DM, HTN, colon cancer 64        SH:    . (Marital)Previous [employer] manager; now owns [employer]    moved from [facility]; [address] [place] [address]. 7 moves in 20yrs    Personal Habits: Cigarette Use: Patient smoking status is unknown        Vitals:    20 1459   BP: 128/64   Pulse: 89   Temp: 97.8 °F (36.6 °C)   SpO2: 98%   Weight: 200 lb (90.7 kg)      Wt Readings from Last 3 Encounters:   20 200 lb (90.7 kg)   19 211 lb (95.7 kg)   18 215 lb (97.5 kg)        Physical Exam  Exam:  Const: Appears comfortable. No signs of acute distress present. Head/Face: Atraumatic on inspection. Eyes: EOMI in both eyes. No discharge from the eyes. PERRL.  Sclerae clear.  ENMT: Auditory canals normal. Tympanic membranes: intact and translucent. External nose WNL. Nasal mucosa is clear. Oropharynx: No erythema or exudate. Posterior pharynx is normal.  Neck: Supple. Palpation reveals no adenopathy. No masses appreciated. No JVD. Carotids: no  bruits. Resp: Respirations are unlabored. Clear to auscultation. No rales, rhonchi or wheezes appreciated  over the lungs bilaterally. CV: Rate is regular. Rhythm is regular. No gallop or rubs. No heart murmur appreciated. Extremities: No clubbing, cyanosis, or edema. No calf inflammation or tenderness. Abdomen: Bowel sounds are normoactive. Abdomen is soft, nontender, and nondistended. No  abdominal masses. No palpable hepatosplenomegaly. Lymph: No palpable or visible regional lymphadenopathy. Musculoskeletal: no acute joint inflammation. Skin: Dry and warm with no rash. Skin normal to inspection and palpation overall. Neuro: Alert and oriented. Affect: appropriate. Upper Extremities: 5/5 bilaterally. Lower Extremities:  5/5 bilaterally. Sensation intact to light touch. Reflexes: DTR's are symmetric and 2+ bilaterally. .  Cranial Nerves: Cranial nerves grossly intact. Assessment and Plan:   Diagnosis Orders   1. Type 2 diabetes mellitus with hyperglycemia, with long-term current use of insulin (HCC)  lisinopril (PRINIVIL;ZESTRIL) 5 MG tablet    metFORMIN (GLUCOPHAGE) 1000 MG tablet    SITagliptin (JANUVIA) 100 MG tablet    Lambert Ugalde MD, Endocrinology, [place]    TSH without Reflex    Comprehensive Metabolic Panel    CBC Auto Differential    Hemoglobin A1C    TSH without Reflex    Urinalysis    Microalbumin / Creatinine Urine Ratio   2. Mixed hyperlipidemia  atorvastatin (LIPITOR) 40 MG tablet    Lipid Panel    TSH without Reflex    Comprehensive Metabolic Panel    CBC Auto Differential    CK    Lipid Panel    TSH without Reflex   3.  Essential hypertension  lisinopril (PRINIVIL;ZESTRIL) 5 MG tablet    TSH without Reflex    Comprehensive Metabolic Panel    CBC Auto Differential    TSH without Reflex   4. Proteinuria, unspecified type  TSH without Reflex    Comprehensive Metabolic Panel    CBC Auto Differential    TSH without Reflex   5. Male erectile disorder  TSH without Reflex    Comprehensive Metabolic Panel    CBC Auto Differential    TSH without Reflex   6. Hyperkalemia  TSH without Reflex    Comprehensive Metabolic Panel    CBC Auto Differential    TSH without Reflex   7. Hypercalcemia  TSH without Reflex    Comprehensive Metabolic Panel    CBC Auto Differential    TSH without Reflex   8. Health maintenance examination     9. Prostate cancer screening  Psa screening       Hypercalcemia  Counseled extensively. Differential reviewed, including serious etiologies. Admits to 2000 spencer a day of whole milk. Counseled. Repeat normalized. Proteinuria  Microalbumin to creatinine ratio was 188 on 7/19 - 123 2/20. His microalbumin to creatinine ratio was 26 on 2/19, 24 on 2/18, 132 on 12/16 and 32 on 9/16     Saw [facility] nephrology in the past.  States it was a waste of time and declines follow-up. Proper hydration reviewed. Avoid nephrotoxic agents. Sugar control emphasized. On ACE inhibitorType 2 diabetes mellitus with hyperglycemia, with long-term current use of insulin ([facility] [address]  counseled. watch BS ambulatory. Parameters given. Call if not in range. ER if  dangerous numbers. micro-and macrovascular complications reviewed, hyper  hypoglycemic precautions reviewed. Importance of at least daily foot exams and  yearly eye exams reviewed. Risk of dka reviewed. Very concerning numbers. Risks of severe diabetic complications reviewed gluten debilitating/fatal events. He is no longer on insulin and he defers this to endocrinology. He is switching from Dr. Chi Avila to Dr. Jacinto Dinh and referral placed today. Willing to start Januvia with precautions. Denies personal or family history of thyroid or pancreatic issues.   Risks reviewed. Refuses glucose transport med now because he states he got a severe yeast infection on Invokana. He is on metformin with precautions including lactic acidosis, proper hydration reviewed. Declines blood work or follow-up before 3 months sooner as needed.     Mixed hyperlipidemia  Lifestyle modification reviewed. risk of hyperlipidemia reviewed. Other treatment  options reviewed. Consider switching fenofibrate to gemfibrozil. Declines change in  therapy. Risks reviewed including cardio/neurovascular and pancreatitis. Has not taken his  Vascepa for over a year. Declines change now. Lifestyle modification emphasized. Sugar control emphasized    Essential hypertension  Stable. Counseled. The risks of hypertension and hypotension reviewed. Watch closely ambulatory. Hyper and hypotensive precautions and parameters reviewed and written as well as parameters on pulse, call if out of range, ER dangers numbers. Lifestyle modification reviewed. Tolerating therapy.      Health maintenance examination  Counseled at length 3/18, encouraged yearly physicals     Male erectile disorder  Counseled extensively. Differential reviewed, including serious etiologies. Treatment  options reviewed. Declines further evaluation. Risks and benefits of Viagra  reviewed and has  with precautions including absolute contra indication with nitrates  and alpha blockers. Counseled on orthostatic precautions. Getting good results with this           Hyperkalemia  Counseled extensively. Differential reviewed, including serious etiologies. Although there have been multiple false positives at the lab recently, the concerns with a truly positive result reviewed. He is asymptomatic. Low potassium intake reviewed. Proper hydration. Discussed adjusting  ACE inhibitor. Repeat normalized. Hypercalcemia  Counseled extensively. Differential reviewed, including serious etiologies. Admits to 2000 spencer a day of whole milk. Counseled. Repeat normalized. No flowsheet data found. Plan as above. Counseled extensively and differential diagnoses relevant to above were reviewed, including serious etiologies. Side effects and interactions of medications were reviewed. In summary, refills given. Willing to start Januvia with precautions. Refer to Dr. Reggie Hernandez. Continue lifestyle modification. Likely need for insulin reviewed but declines having me start it. Lifestyle modification emphasized. Risk of complications related to his diagnoses reviewed and otherwise simply wants blood work and follow-up in 3 months sooner as needed. Check with insurance before blood work. Plan physical next time. As long as symptoms steadily improve/resolve, and medical conditions follow the expected course, FU as below, sooner PRN. Return in about 3 months (around 6/12/2020). Signs and symptoms to watch for discussed, serious signs and symptoms reviewed. ER if any. Over 40 minutes  spent with the patient in reviewing records, reviewing with patient/family, counseling, ordering,  prescribing, completing h&p, etc., with over 50% of the time spent face to face counseling. Rosana Landa MD    Patients are advised to check with insurance company to ensure coverage and to fully understand benefits and cost prior to any testing. This note was created with the assistance of voice recognition software. Document was reviewed however may contain grammatical errors.

## 2020-04-02 ENCOUNTER — PATIENT MESSAGE (OUTPATIENT)
Dept: PRIMARY CARE CLINIC | Age: 50
End: 2020-04-02

## 2020-04-08 RX ORDER — LISINOPRIL 5 MG/1
5 TABLET ORAL DAILY
Qty: 30 TABLET | Refills: 0 | Status: SHIPPED
Start: 2020-04-08 | End: 2021-04-09

## 2020-04-08 RX ORDER — LISINOPRIL 5 MG/1
5 TABLET ORAL DAILY
Qty: 90 TABLET | Refills: 3 | Status: SHIPPED
Start: 2020-04-08 | End: 2021-04-09 | Stop reason: SDUPTHER

## 2020-04-08 RX ORDER — FENOFIBRIC ACID 135 MG/1
CAPSULE, DELAYED RELEASE ORAL
COMMUNITY
Start: 2018-06-27 | End: 2020-04-08 | Stop reason: SDUPTHER

## 2020-04-08 RX ORDER — FENOFIBRIC ACID 135 MG/1
135 CAPSULE, DELAYED RELEASE ORAL DAILY
Qty: 30 CAPSULE | Refills: 0 | Status: SHIPPED
Start: 2020-04-08 | End: 2021-04-09

## 2020-04-08 RX ORDER — FENOFIBRIC ACID 135 MG/1
135 CAPSULE, DELAYED RELEASE ORAL DAILY
COMMUNITY
End: 2021-04-09

## 2020-04-08 RX ORDER — FENOFIBRIC ACID 135 MG/1
135 CAPSULE, DELAYED RELEASE ORAL DAILY
Qty: 90 CAPSULE | Refills: 3 | Status: SHIPPED
Start: 2020-04-08 | End: 2021-04-09 | Stop reason: SDUPTHER

## 2020-04-08 RX ORDER — LISINOPRIL 5 MG/1
5 TABLET ORAL DAILY
COMMUNITY
End: 2020-04-08 | Stop reason: SDUPTHER

## 2020-04-08 NOTE — TELEPHONE ENCOUNTER
Pt requesting refills for Lisinopril and Fenofibric acid. Pt needs 30 day supply sent to [facility] and 90 days supply sent to mail order. All meds pended for both 30 and 90 days.     Dose and sig verified with pt

## 2020-04-11 PROBLEM — Z12.5 PROSTATE CANCER SCREENING: Status: RESOLVED | Noted: 2020-03-12 | Resolved: 2020-04-11

## 2020-04-11 PROBLEM — Z00.00 HEALTH MAINTENANCE EXAMINATION: Status: RESOLVED | Noted: 2019-07-01 | Resolved: 2020-04-11

## 2020-04-15 ENCOUNTER — VIRTUAL VISIT (OUTPATIENT)
Dept: ENDOCRINOLOGY | Age: 50
End: 2020-04-15
Payer: COMMERCIAL

## 2020-04-15 ENCOUNTER — TELEPHONE (OUTPATIENT)
Dept: ENDOCRINOLOGY | Age: 50
End: 2020-04-15

## 2020-04-15 PROCEDURE — 2022F DILAT RTA XM EVC RTNOPTHY: CPT | Performed by: INTERNAL MEDICINE

## 2020-04-15 PROCEDURE — 3046F HEMOGLOBIN A1C LEVEL >9.0%: CPT | Performed by: INTERNAL MEDICINE

## 2020-04-15 PROCEDURE — G8427 DOCREV CUR MEDS BY ELIG CLIN: HCPCS | Performed by: INTERNAL MEDICINE

## 2020-04-15 PROCEDURE — 99204 OFFICE O/P NEW MOD 45 MIN: CPT | Performed by: INTERNAL MEDICINE

## 2020-04-15 PROCEDURE — 1036F TOBACCO NON-USER: CPT | Performed by: INTERNAL MEDICINE

## 2020-04-15 PROCEDURE — G8417 CALC BMI ABV UP PARAM F/U: HCPCS | Performed by: INTERNAL MEDICINE

## 2020-04-15 RX ORDER — PEN NEEDLE, DIABETIC 32GX 5/32"
NEEDLE, DISPOSABLE MISCELLANEOUS
Qty: 250 EACH | Refills: 5 | Status: SHIPPED
Start: 2020-04-15 | End: 2022-07-28 | Stop reason: SDUPTHER

## 2020-04-15 RX ORDER — FLASH GLUCOSE SCANNING READER
EACH MISCELLANEOUS
Qty: 1 DEVICE | Refills: 0 | Status: SHIPPED
Start: 2020-04-15 | End: 2022-06-09 | Stop reason: ALTCHOICE

## 2020-04-15 RX ORDER — FLASH GLUCOSE SENSOR
KIT MISCELLANEOUS
Qty: 2 EACH | Refills: 5 | Status: SHIPPED
Start: 2020-04-15 | End: 2020-09-30

## 2020-04-15 RX ORDER — INSULIN LISPRO 200 [IU]/ML
INJECTION, SOLUTION SUBCUTANEOUS
Qty: 10 PEN | Refills: 3 | Status: SHIPPED
Start: 2020-04-15 | End: 2020-10-19 | Stop reason: SDUPTHER

## 2020-04-15 NOTE — LETTER
hypoglycemic episodes   The patient hasn't been mindful of what has been eating and wasn't following diabetes diet as encouraged   I reviewed current medications and the patient has no issues with diabetes medications  Russell Sanchez is up to date with eye exam and denied any history of diabetic retinopathy   The patient performs  own feet care  Microvascular complications:  No Retinopathy, Nephropathy or Neuropathy   Macrovascular complications: no CAD, PVD, or Stroke  The patient refuses Flushot and pneumonia vaccine     PAST MEDICAL HISTORY   Past Medical History:   Diagnosis Date    Encounter for screening colonoscopy 4/22/2019    Erectile dysfunction     Headache     migraine    Hyperlipidemia     Hypertension     Isolated proteinuria     Type 2 diabetes mellitus without complication (HCC)     insulin dependent    Type II or unspecified type diabetes mellitus without mention of complication, not stated as uncontrolled        PAST SURGICAL HISTORY   Past Surgical History:   Procedure Laterality Date    ADENOIDECTOMY AND TYMPANOSTOMY TUBE PLACEMENT      COLONOSCOPY N/A 4/22/2019    COLONOSCOPY WITH BIOPSY performed by Jacklyn Denton MD at [address]      age 11 bilateral inguinal    TONSILLECTOMY AND ADENOIDECTOMY      VASECTOMY         SOCIAL HISTORY   Tobacco:   reports that he has quit smoking. His smoking use included cigarettes. He started smoking about 19 years ago. He has quit using smokeless tobacco.  Alcohol:   reports current alcohol use. Drugs:   reports no history of drug use.     FAMILY HISTORY   Family History   Problem Relation Age of Onset    Thyroid Disease Mother     Diabetes Mother     High Blood Pressure Mother     Arthritis Mother     Cancer Mother     High Cholesterol Mother     Cirrhosis Father     Substance Abuse Father     Diabetes Brother     Diabetes Brother        ALLERGIES AND DRUG REACTIONS   No Known Allergies    CURRENT MEDICATIONS   Current Outpatient Medications   Medication Sig Dispense Refill    fenofibric acid (FIBRICOR) 135 MG CPDR capsule Take 1 capsule by mouth daily 90 capsule 3    lisinopril (PRINIVIL;ZESTRIL) 5 MG tablet Take 1 tablet by mouth daily 90 tablet 3    fenofibric acid (FIBRICOR) 135 MG CPDR capsule Take 135 mg by mouth daily      lisinopril (PRINIVIL;ZESTRIL) 5 MG tablet Take 1 tablet by mouth daily 30 tablet 0    fenofibric acid (FIBRICOR) 135 MG CPDR capsule Take 1 capsule by mouth daily 30 capsule 0    metFORMIN (GLUCOPHAGE) 1000 MG tablet Take 1 tablet by mouth 2 times daily (with meals) for 14 days 28 tablet 0    atorvastatin (LIPITOR) 40 MG tablet Take 1 tablet by mouth daily 90 tablet 3    SITagliptin (JANUVIA) 100 MG tablet Take 1 tablet by mouth daily 30 tablet 3    aspirin 81 MG tablet Take 81 mg by mouth daily      Omega-3 Fatty Acids (FISH OIL) 1200 MG CAPS Take by mouth 2 times daily      sildenafil (REVATIO) 20 MG tablet 1-2 po 1/2-4hrs prior to sexual activity, max one dose per day. 30 tablet 3     No current facility-administered medications for this visit. Review of Systems  Constitutional: No fever, no chills, no diaphoresis, no generalized weakness. HEENT: No blurred vision, No sore throat, no ear pain, no hair loss  Neck: denied any neck swelling, difficulty swallowing,   Cardio-pulmonary: No CP, SOB or palpitation, No orthopnea or PND. No cough or wheezing. GI: No N/V/D, no constipation, No abdominal pain, no melena or hematochezia   : Denied any dysuria, hematuria, flank pain, discharge, or incontinence. Skin: denied any rash, ulcer, Hirsute, or hyperpigmentation. MSK: denied any joint deformity, joint pain/swelling, muscle pain, or back pain. Neuro: no numbness, no tingling, no weakness, _    OBJECTIVE    There were no vitals taken for this visit.   BP Readings from Last 4 Encounters:   03/12/20 128/64   07/01/19 128/72   12/19/18 (!) 150/84   04/22/19 118/74 LABA1C 9.7 02/09/2019    LABA1C 9.7 02/09/2019     Lab Results   Component Value Date    TRIG 307 02/22/2020    HDL 33 02/22/2020    LDLCALC 73 02/22/2020    CHOL 167 02/22/2020     No results found for: Raphael Liriano [facility] [address] Records/Labs/Images review:   I personally reviewed and summarized previous records   All labs and imaging studies were independently reviewed     [address] [facility] [place], a 52 y.o.-old male seen in for the following issues     Diabetes Mellitus Type 2     · Patient's diabetes is uncontrol   · Continue Metformin 1000 mg twice a day   · Stop Januvia   · Take Tresiba 30 units daily at bedtime (starting from tonight)   · Take Humalog 12 units with meals + sliding scale 3:50>150   · The patient was advised to check blood sugars 4 times a day before meals and at bedtime and send BS readings to our office in a week. · Discussed with patient A1c and blood sugar goals   · Optimal blood sugars: 100-140 pre-prandial, < 180 peak post-prandial  · The patient counseled about the complications of uncontrolled diabetes   · Patient was counselled about the importance of self-blood glucose monitoring and eating consistent carb diet to avoid blood sugar fluctuations   · Patient will need routine diabetes maintenance and prevention  · The patient was referred to diabetic educator for further teaching   · Discussed lifestyle changes including diet and exercise with patient; recommended 150 minutes of moderate intensity exercise per week. · Diabetes labs before next visit     Dietary noncompliance   Discussed with patient the importance of eating consistent carbohydrate meals, avoiding high glycemic index food. Also, discussed with patient the risk and negative consequences of dietary noncompliance on blood glucose control, blood pressure and weight    Obesity   Discussed lifestyle changes including diet and exercise with patient in depth.  Also discussed with patient cardiovascular

## 2020-05-26 ENCOUNTER — HOSPITAL ENCOUNTER (OUTPATIENT)
Dept: DIABETES SERVICES | Age: 50
Setting detail: THERAPIES SERIES
Discharge: HOME | End: 2020-05-26
Payer: COMMERCIAL

## 2020-05-26 VITALS — HEIGHT: 68 IN | TEMPERATURE: 97.6 F | BODY MASS INDEX: 30.92 KG/M2 | WEIGHT: 204 LBS

## 2020-05-26 PROCEDURE — 97802 MEDICAL NUTRITION INDIV IN: CPT

## 2020-05-26 PROCEDURE — 97803 MED NUTRITION INDIV SUBSEQ: CPT

## 2020-05-26 SDOH — ECONOMIC STABILITY: FOOD INSECURITY: ADDITIONAL INFORMATION: NO

## 2020-05-26 ASSESSMENT — PATIENT HEALTH QUESTIONNAIRE - PHQ9
SUM OF ALL RESPONSES TO PHQ QUESTIONS 1-9: 0
SUM OF ALL RESPONSES TO PHQ9 QUESTIONS 1 & 2: 0
2. FEELING DOWN, DEPRESSED OR HOPELESS: 0
SUM OF ALL RESPONSES TO PHQ QUESTIONS 1-9: 0
1. LITTLE INTEREST OR PLEASURE IN DOING THINGS: 0

## 2020-05-26 NOTE — LETTER
[facility]) - Diabetes Education    2020     Re:     Gale Recinos  :  1970    Dear Dr. Agnes Barrett,     Thank you for referring your patient, Gale Recinos, to Diabetes Education Medical Nutrition Therapy. Your patient was here on 2020 and the following were addressed:        [x] Nutrition as Related to Diabetes    [x] Carbohydrate Counting     [x] Free Food List    [x] Combination Foods    [x] Fast Foods    [x] Weighing and measuring    [x] Meal Planning    [x] Using Food Labels - Label Reading  [] Diabetes Education Class Schedule    [x] Diet Instruction       [x]  Diet instructed provided on:  Calorie carbohydrate controlled: 14 carbohydrate choices/day 4 choices breakfast, lunch and dinner, 2 choices HS snack    Upon completion of this session, the following evaluation of your patients progress was made:    ASSESSMENT:  [x]  verbalizes understanding of diet principles  [x]  understands the relationship between diet and health  [x]  identifies proper food choices  [x]  identifies needed diet changes  [x]  expresses intentions to comply with diet guidelines  [x]  able to provide correct answers when asked    GOAL:  [x]  to follow individual meal plan  [x]  to weigh and measure food portions  [x]  to call with further questions  []  to attend diabetes education class sessions 1 and/or 2    PATIENT EDUCATION MATERIALS PROVIDED:  [x]  plate carb counting meal plan  [x]  low fat guidelines  [x]  carb counting sheet  []  low sodium guidelines  []  Other:    PHQ- 2 Depression Screen Score: 0       0-4: Minimal Depression 15-19: Moderately Severe Depression   5-9: Mild Depression  20-27: Severe Depression           10-14: Moderate Depression    COMMENTS:           Patient is encouraged to call with further questions or call and re-schedule other sessions within a year from original date.     Thank you for referring this patient to our program.  Please do not hesitate to

## 2020-06-08 ENCOUNTER — VIRTUAL VISIT (OUTPATIENT)
Dept: ENDOCRINOLOGY | Age: 50
End: 2020-06-08
Payer: COMMERCIAL

## 2020-06-08 PROCEDURE — G8417 CALC BMI ABV UP PARAM F/U: HCPCS | Performed by: INTERNAL MEDICINE

## 2020-06-08 PROCEDURE — 99214 OFFICE O/P EST MOD 30 MIN: CPT | Performed by: INTERNAL MEDICINE

## 2020-06-08 PROCEDURE — 3046F HEMOGLOBIN A1C LEVEL >9.0%: CPT | Performed by: INTERNAL MEDICINE

## 2020-06-08 PROCEDURE — 1036F TOBACCO NON-USER: CPT | Performed by: INTERNAL MEDICINE

## 2020-06-08 PROCEDURE — 2022F DILAT RTA XM EVC RTNOPTHY: CPT | Performed by: INTERNAL MEDICINE

## 2020-06-08 PROCEDURE — G8427 DOCREV CUR MEDS BY ELIG CLIN: HCPCS | Performed by: INTERNAL MEDICINE

## 2020-06-08 NOTE — LETTER
Date    LABA1C 12.7 02/22/2020    GLUCOSE 314 02/22/2020    MALBCR 123.8 02/22/2020    LABMICR 75.5 02/22/2020    LABCREA 61 02/22/2020     Lab Results   Component Value Date    LABA1C 12.7 02/22/2020    LABA1C 9.3 06/08/2019    LABA1C 9.7 02/09/2019    LABA1C 9.7 02/09/2019     Lab Results   Component Value Date    TRIG 307 02/22/2020    HDL 33 02/22/2020    LDLCALC 73 02/22/2020    CHOL 167 02/22/2020     No results found for: [address] [facility] [address] Records/Labs/Images review:   I personally reviewed and summarized previous records   All labs and imaging studies were independently reviewed     [address] [facility] [place], a 52 y.o.-old male seen in for the following issues     Diabetes Mellitus Type 2     · Imprpoving control but still above goal   · Continue Metformin 1000 mg twice a day   · Change insulin regimen to [place] 30 units daily at bedtime ,  Humalog 12/12/15 units + ss 3:50>150   · Continue using freestyle justin scanner and bring device for download in a week or two   · Discussed with patient A1c and blood sugar goals   · Diabetes labs before next visit     Dietary noncompliance   Improved since last visit    Discussed with patient the importance of eating consistent carbohydrate meals, avoiding high glycemic index food. Also, discussed with patient the risk and negative consequences of dietary noncompliance on blood glucose control, blood pressure and weight    vitD deficiency   · Continue vitD supplement     Return in about 4 months (around 10/8/2020) for DM type 2 on insulin . The above issues were reviewed with the patient who understood and agreed with the plan. Thank you for allowing us to participate in the care of this patient. Please do not hesitate to contact us with any additional questions. Diagnosis Orders   1.  IDDM (insulin dependent diabetes mellitus) ([facility] [address]  Hemoglobin A1C     Abdifatah Morocho MD  Endocrinologist, [facility]

## 2020-06-08 NOTE — PROGRESS NOTES
3    fenofibric acid (FIBRICOR) 135 MG CPDR capsule Take 135 mg by mouth daily      lisinopril (PRINIVIL;ZESTRIL) 5 MG tablet Take 1 tablet by mouth daily 30 tablet 0    fenofibric acid (FIBRICOR) 135 MG CPDR capsule Take 1 capsule by mouth daily 30 capsule 0    atorvastatin (LIPITOR) 40 MG tablet Take 1 tablet by mouth daily 90 tablet 3    aspirin 81 MG tablet Take 81 mg by mouth daily      Omega-3 Fatty Acids (FISH OIL) 1200 MG CAPS Take by mouth 2 times daily      sildenafil (REVATIO) 20 MG tablet 1-2 po 1/2-4hrs prior to sexual activity, max one dose per day. 30 tablet 3    metFORMIN (GLUCOPHAGE) 1000 MG tablet Take 1 tablet by mouth 2 times daily (with meals) for 14 days 28 tablet 0     No current facility-administered medications for this visit. Review of Systems  Constitutional: No fever, no chills, no diaphoresis, no generalized weakness. HEENT: No blurred vision, No sore throat, no ear pain, no hair loss  Neck: denied any neck swelling, difficulty swallowing,   Cardio-pulmonary: No CP, SOB or palpitation, No orthopnea or PND. No cough or wheezing. GI: No N/V/D, no constipation, No abdominal pain, no melena or hematochezia   : Denied any dysuria, hematuria, flank pain, discharge, or incontinence. Skin: denied any rash, ulcer, Hirsute, or hyperpigmentation. MSK: denied any joint deformity, joint pain/swelling, muscle pain, or back pain. Neuro: no numbness, no tingling, no weakness, _    OBJECTIVE    There were no vitals taken for this visit.   BP Readings from Last 4 Encounters:   03/12/20 128/64   07/01/19 128/72   12/19/18 (!) 150/84   04/22/19 118/74     Wt Readings from Last 6 Encounters:   05/26/20 204 lb (92.5 kg)   03/12/20 200 lb (90.7 kg)   07/01/19 211 lb (95.7 kg)   12/19/18 215 lb (97.5 kg)   04/22/19 210 lb (95.3 kg)   03/07/19 211 lb (95.7 kg)     Physical examination:  Due to this being a TeleHealth encounter, evaluation of the following organ systems is limited:

## 2020-09-30 RX ORDER — FLASH GLUCOSE SENSOR
KIT MISCELLANEOUS
Qty: 3 EACH | Refills: 5 | Status: SHIPPED
Start: 2020-09-30 | End: 2021-04-12

## 2020-10-19 ENCOUNTER — VIRTUAL VISIT (OUTPATIENT)
Dept: ENDOCRINOLOGY | Age: 50
End: 2020-10-19
Payer: COMMERCIAL

## 2020-10-19 PROBLEM — Z91.119 DIETARY NONCOMPLIANCE: Status: ACTIVE | Noted: 2020-10-19

## 2020-10-19 PROCEDURE — G8427 DOCREV CUR MEDS BY ELIG CLIN: HCPCS | Performed by: INTERNAL MEDICINE

## 2020-10-19 PROCEDURE — G8417 CALC BMI ABV UP PARAM F/U: HCPCS | Performed by: INTERNAL MEDICINE

## 2020-10-19 PROCEDURE — 2022F DILAT RTA XM EVC RTNOPTHY: CPT | Performed by: INTERNAL MEDICINE

## 2020-10-19 PROCEDURE — G8484 FLU IMMUNIZE NO ADMIN: HCPCS | Performed by: INTERNAL MEDICINE

## 2020-10-19 PROCEDURE — 1036F TOBACCO NON-USER: CPT | Performed by: INTERNAL MEDICINE

## 2020-10-19 PROCEDURE — 3046F HEMOGLOBIN A1C LEVEL >9.0%: CPT | Performed by: INTERNAL MEDICINE

## 2020-10-19 PROCEDURE — 99214 OFFICE O/P EST MOD 30 MIN: CPT | Performed by: INTERNAL MEDICINE

## 2020-10-19 RX ORDER — FLASH GLUCOSE SENSOR
KIT MISCELLANEOUS
Qty: 2 EACH | Refills: 5 | Status: SHIPPED
Start: 2020-10-19 | End: 2021-04-09 | Stop reason: SDUPTHER

## 2020-10-19 RX ORDER — INSULIN LISPRO 200 [IU]/ML
INJECTION, SOLUTION SUBCUTANEOUS
Qty: 25 PEN | Refills: 5 | Status: SHIPPED
Start: 2020-10-19 | End: 2021-04-04 | Stop reason: SDUPTHER

## 2020-10-19 NOTE — PROGRESS NOTES
Celena Simmonds was read the following message We want to confirm that, for purposes of billing, this is a virtual visit with your provider for which we will submit a claim for reimbursement with your insurance company. You will be responsible for any copays, coinsurance amounts or other amounts not covered by your insurance company. If you do not accept this, unfortunately we will not be able to schedule a virtual visit with the provider. Do you accept?  Vito Garcia responded YES

## 2020-10-19 NOTE — PROGRESS NOTES
[address] [facility] Department of Endocrinology Diabetes and Metabolism   [address] [address]   Phone: [phone]  Fax: [phone]    Date of Service: 10/19/2020    Primary Care Physician: Catherine Ta MD  Provider: Soraya Hager MD     Reason for the visit:  DM type 2     History of Present Illness: The history is provided by the patient. No  was used. Accuracy of the patient data is excellent.   Mari Wright is a very pleasant 52 y.o. male seen today for diabetes management     Mari Wright was diagnosed with diabetes at age 47 and he is currently on Tresiba 30 units daily at bedtime, Humalog 12 units with meals + ss 3:50>150, Metformin 1000 mg BID  The patient has been checking blood sugar 4 times a day using freestyle justin system   Pt reported being very busy at work and for this reason he wasn't strictly following DM diet recently   Due for A1c and DM labs   Lab Results   Component Value Date    LABA1C 12.7 02/22/2020    LABA1C 9.3 06/08/2019    LABA1C 9.7 02/09/2019    LABA1C 9.7 02/09/2019     Patient has had no hypoglycemic episodes   Since last OV, the patient has been mindful of what has been eating and trying to follow diabetes diet as encouraged   I reviewed current medications and the patient has no issues with diabetes medications  Mari Wright is up to date with eye exam and denied any history of diabetic retinopathy   The patient performs  own feet care  Microvascular complications:  No Retinopathy, Nephropathy or Neuropathy   Macrovascular complications: no CAD, PVD, or Stroke  The patient refuses Flushot and pneumonia vaccine     PAST MEDICAL HISTORY   Past Medical History:   Diagnosis Date    Encounter for screening colonoscopy 4/22/2019    Erectile dysfunction     Headache     migraine    Hyperlipidemia     Hypertension     Isolated proteinuria     Type 2 diabetes mellitus without capsule 3    lisinopril (PRINIVIL;ZESTRIL) 5 MG tablet Take 1 tablet by mouth daily 90 tablet 3    fenofibric acid (FIBRICOR) 135 MG CPDR capsule Take 135 mg by mouth daily      lisinopril (PRINIVIL;ZESTRIL) 5 MG tablet Take 1 tablet by mouth daily 30 tablet 0    fenofibric acid (FIBRICOR) 135 MG CPDR capsule Take 1 capsule by mouth daily 30 capsule 0    metFORMIN (GLUCOPHAGE) 1000 MG tablet Take 1 tablet by mouth 2 times daily (with meals) for 14 days 28 tablet 0    atorvastatin (LIPITOR) 40 MG tablet Take 1 tablet by mouth daily 90 tablet 3    aspirin 81 MG tablet Take 81 mg by mouth daily      Omega-3 Fatty Acids (FISH OIL) 1200 MG CAPS Take by mouth 2 times daily      sildenafil (REVATIO) 20 MG tablet 1-2 po 1/2-4hrs prior to sexual activity, max one dose per day. 30 tablet 3     No current facility-administered medications for this visit. Review of Systems  Constitutional: No fever, no chills, no diaphoresis, no generalized weakness. HEENT: No blurred vision, No sore throat, no ear pain, no hair loss  Neck: denied any neck swelling, difficulty swallowing,   Cardio-pulmonary: No CP, SOB or palpitation, No orthopnea or PND. No cough or wheezing. GI: No N/V/D, no constipation, No abdominal pain, no melena or hematochezia   : Denied any dysuria, hematuria, flank pain, discharge, or incontinence. Skin: denied any rash, ulcer, Hirsute, or hyperpigmentation. MSK: denied any joint deformity, joint pain/swelling, muscle pain, or back pain. Neuro: no numbness, no tingling, no weakness, _    OBJECTIVE    There were no vitals taken for this visit.   BP Readings from Last 4 Encounters:   03/12/20 128/64   07/01/19 128/72   12/19/18 (!) 150/84   04/22/19 118/74     Wt Readings from Last 6 Encounters:   05/26/20 204 lb (92.5 kg)   03/12/20 200 lb (90.7 kg)   07/01/19 211 lb (95.7 kg)   12/19/18 215 lb (97.5 kg)   04/22/19 210 lb (95.3 kg)   03/07/19 211 lb (95.7 kg)     Physical examination:  Due to this being a TeleHealth encounter, evaluation of the following organ systems is limited: EENT/Resp/CV/GI//MS/Neuro/Skin/Heme-Lymph-Imm. General: awake alert, oriented x3, no abnormal position or movements.   Pulm: move with respiration   Skin: no rash  Psych: normal mood, and affect    Review of Laboratory Data:  I personally reviewed the following lab:  Lab Results   Component Value Date/Time    WBC 5.4 02/22/2020 08:28 AM    RBC 5.04 02/22/2020 08:28 AM    HGB 14.3 02/22/2020 08:28 AM    HCT 45.5 02/22/2020 08:28 AM    MCV 90.3 02/22/2020 08:28 AM    MCH 28.4 02/22/2020 08:28 AM    MCHC 31.4 (L) 02/22/2020 08:28 AM    RDW 13.0 02/22/2020 08:28 AM     02/22/2020 08:28 AM    MPV 11.3 02/22/2020 08:28 AM      Lab Results   Component Value Date/Time     02/22/2020 08:28 AM    K 4.5 03/11/2020 10:05 AM    CO2 24 02/22/2020 08:28 AM    BUN 14 02/22/2020 08:28 AM    CREATININE 0.7 02/22/2020 08:28 AM    CALCIUM 9.5 03/11/2020 10:05 AM    LABGLOM >60 02/22/2020 08:28 AM    GFRAA >60 02/22/2020 08:28 AM      Lab Results   Component Value Date/Time    TSH 2.830 02/22/2020 08:28 AM     Lab Results   Component Value Date    LABA1C 12.7 02/22/2020    GLUCOSE 314 02/22/2020    MALBCR 123.8 02/22/2020    LABMICR 75.5 02/22/2020    LABCREA 61 02/22/2020     Lab Results   Component Value Date    LABA1C 12.7 02/22/2020    LABA1C 9.3 06/08/2019    LABA1C 9.7 02/09/2019    LABA1C 9.7 02/09/2019     Lab Results   Component Value Date    TRIG 307 02/22/2020    HDL 33 02/22/2020    LDLCALC 73 02/22/2020    CHOL 167 02/22/2020     No results found for: VITD25    Medical Records/Labs/Images review:   I personally reviewed and summarized previous records   All labs and imaging studies were independently reviewed     [address], a 52 y.o.-old male seen in for the following issues     Diabetes Mellitus Type 2     · Imprpoving control but still above goal, due for labs now exposure to COVID-19 and provide continuity of care.

## 2020-11-10 NOTE — TELEPHONE ENCOUNTER
Pt called and scheduled an appt for 11/25/20. He is requesting a refill on [employer].   His pharmacy is [facility] on [address].

## 2020-11-11 RX ORDER — SILDENAFIL CITRATE 20 MG/1
TABLET ORAL
Qty: 30 TABLET | Refills: 3 | Status: SHIPPED
Start: 2020-11-11 | End: 2020-11-11 | Stop reason: SDUPTHER

## 2020-11-11 RX ORDER — SILDENAFIL CITRATE 20 MG/1
TABLET ORAL
Qty: 30 TABLET | Refills: 3 | Status: SHIPPED
Start: 2020-11-11 | End: 2021-07-11 | Stop reason: SDUPTHER

## 2021-04-04 DIAGNOSIS — R80.9 PROTEINURIA, UNSPECIFIED TYPE: ICD-10-CM

## 2021-04-04 DIAGNOSIS — E11.9 TYPE 2 DIABETES MELLITUS WITHOUT COMPLICATION, WITH LONG-TERM CURRENT USE OF INSULIN (HCC): ICD-10-CM

## 2021-04-04 DIAGNOSIS — Z79.4 TYPE 2 DIABETES MELLITUS WITHOUT COMPLICATION, WITH LONG-TERM CURRENT USE OF INSULIN (HCC): ICD-10-CM

## 2021-04-04 DIAGNOSIS — E11.65 TYPE 2 DIABETES MELLITUS WITH HYPERGLYCEMIA, WITH LONG-TERM CURRENT USE OF INSULIN (HCC): ICD-10-CM

## 2021-04-04 DIAGNOSIS — E78.2 MIXED HYPERLIPIDEMIA: ICD-10-CM

## 2021-04-04 DIAGNOSIS — I10 ESSENTIAL HYPERTENSION: Primary | ICD-10-CM

## 2021-04-04 DIAGNOSIS — Z79.4 TYPE 2 DIABETES MELLITUS WITH HYPERGLYCEMIA, WITH LONG-TERM CURRENT USE OF INSULIN (HCC): ICD-10-CM

## 2021-04-05 ENCOUNTER — IMMUNIZATION (OUTPATIENT)
Dept: PRIMARY CARE CLINIC | Age: 51
End: 2021-04-05
Payer: COMMERCIAL

## 2021-04-05 PROCEDURE — 91301 COVID-19, MODERNA VACCINE 100MCG/0.5ML DOSE: CPT | Performed by: PHYSICIAN ASSISTANT

## 2021-04-05 PROCEDURE — 0011A COVID-19, MODERNA VACCINE 100MCG/0.5ML DOSE: CPT | Performed by: PHYSICIAN ASSISTANT

## 2021-04-05 RX ORDER — INSULIN LISPRO 200 [IU]/ML
INJECTION, SOLUTION SUBCUTANEOUS
Qty: 25 PEN | Refills: 5 | Status: SHIPPED
Start: 2021-04-05 | End: 2021-12-30 | Stop reason: SDUPTHER

## 2021-04-05 NOTE — TELEPHONE ENCOUNTER
OK.  Sent. But looks like he was due for a follow-up appointment June 2020.   Please make follow-up as directed with blood work if applicable

## 2021-04-06 NOTE — TELEPHONE ENCOUNTER
Patient notified, appointment scheduled for this coming Friday as virtual.  Would like to have labs completed tomorrow morning. Please place orders.

## 2021-04-07 DIAGNOSIS — Z79.4 TYPE 2 DIABETES MELLITUS WITH HYPERGLYCEMIA, WITH LONG-TERM CURRENT USE OF INSULIN (HCC): ICD-10-CM

## 2021-04-07 DIAGNOSIS — I10 ESSENTIAL HYPERTENSION: ICD-10-CM

## 2021-04-07 DIAGNOSIS — R80.9 PROTEINURIA, UNSPECIFIED TYPE: ICD-10-CM

## 2021-04-07 DIAGNOSIS — E11.65 TYPE 2 DIABETES MELLITUS WITH HYPERGLYCEMIA, WITH LONG-TERM CURRENT USE OF INSULIN (HCC): ICD-10-CM

## 2021-04-07 DIAGNOSIS — E78.2 MIXED HYPERLIPIDEMIA: ICD-10-CM

## 2021-04-07 LAB
ALBUMIN SERPL-MCNC: 4.3 G/DL (ref 3.5–5.2)
ALP BLD-CCNC: 77 U/L (ref 40–129)
ALT SERPL-CCNC: 26 U/L (ref 0–40)
ANION GAP SERPL CALCULATED.3IONS-SCNC: 15 MMOL/L (ref 7–16)
AST SERPL-CCNC: 18 U/L (ref 0–39)
BACTERIA: NORMAL /HPF
BASOPHILS ABSOLUTE: 0.06 E9/L (ref 0–0.2)
BASOPHILS RELATIVE PERCENT: 1.2 % (ref 0–2)
BILIRUB SERPL-MCNC: 0.4 MG/DL (ref 0–1.2)
BILIRUBIN URINE: NEGATIVE
BLOOD, URINE: NEGATIVE
BUN BLDV-MCNC: 15 MG/DL (ref 6–20)
CALCIUM SERPL-MCNC: 9.3 MG/DL (ref 8.6–10.2)
CHLORIDE BLD-SCNC: 97 MMOL/L (ref 98–107)
CHOLESTEROL, TOTAL: 156 MG/DL (ref 0–199)
CLARITY: CLEAR
CO2: 23 MMOL/L (ref 22–29)
COLOR: YELLOW
CREAT SERPL-MCNC: 0.8 MG/DL (ref 0.7–1.2)
CREATININE URINE: 127 MG/DL (ref 40–278)
EOSINOPHILS ABSOLUTE: 0.12 E9/L (ref 0.05–0.5)
EOSINOPHILS RELATIVE PERCENT: 2.3 % (ref 0–6)
GFR AFRICAN AMERICAN: >60
GFR NON-AFRICAN AMERICAN: >60 ML/MIN/1.73
GLUCOSE BLD-MCNC: 313 MG/DL (ref 74–99)
GLUCOSE URINE: >=1000 MG/DL
HBA1C MFR BLD: 10 % (ref 4–5.6)
HCT VFR BLD CALC: 43.5 % (ref 37–54)
HDLC SERPL-MCNC: 30 MG/DL
HEMOGLOBIN: 14.2 G/DL (ref 12.5–16.5)
IMMATURE GRANULOCYTES #: 0.02 E9/L
IMMATURE GRANULOCYTES %: 0.4 % (ref 0–5)
KETONES, URINE: NEGATIVE MG/DL
LDL CHOLESTEROL CALCULATED: 58 MG/DL (ref 0–99)
LEUKOCYTE ESTERASE, URINE: NEGATIVE
LYMPHOCYTES ABSOLUTE: 1.51 E9/L (ref 1.5–4)
LYMPHOCYTES RELATIVE PERCENT: 29 % (ref 20–42)
MCH RBC QN AUTO: 28.6 PG (ref 26–35)
MCHC RBC AUTO-ENTMCNC: 32.6 % (ref 32–34.5)
MCV RBC AUTO: 87.7 FL (ref 80–99.9)
MICROALBUMIN UR-MCNC: 264.4 MG/L
MICROALBUMIN/CREAT UR-RTO: 208.2 (ref 0–30)
MONOCYTES ABSOLUTE: 0.5 E9/L (ref 0.1–0.95)
MONOCYTES RELATIVE PERCENT: 9.6 % (ref 2–12)
NEUTROPHILS ABSOLUTE: 2.99 E9/L (ref 1.8–7.3)
NEUTROPHILS RELATIVE PERCENT: 57.5 % (ref 43–80)
NITRITE, URINE: NEGATIVE
PDW BLD-RTO: 12.9 FL (ref 11.5–15)
PH UA: 6.5 (ref 5–9)
PLATELET # BLD: 210 E9/L (ref 130–450)
PMV BLD AUTO: 11.4 FL (ref 7–12)
POTASSIUM SERPL-SCNC: 4.7 MMOL/L (ref 3.5–5)
PROTEIN UA: 30 MG/DL
RBC # BLD: 4.96 E12/L (ref 3.8–5.8)
RBC UA: NORMAL /HPF (ref 0–2)
SODIUM BLD-SCNC: 135 MMOL/L (ref 132–146)
SPECIFIC GRAVITY UA: 1.02 (ref 1–1.03)
TOTAL PROTEIN: 7.6 G/DL (ref 6.4–8.3)
TRIGL SERPL-MCNC: 338 MG/DL (ref 0–149)
TSH SERPL DL<=0.05 MIU/L-ACNC: 1.83 UIU/ML (ref 0.27–4.2)
UROBILINOGEN, URINE: 1 E.U./DL
VLDLC SERPL CALC-MCNC: 68 MG/DL
WBC # BLD: 5.2 E9/L (ref 4.5–11.5)
WBC UA: NORMAL /HPF (ref 0–5)

## 2021-04-09 ENCOUNTER — VIRTUAL VISIT (OUTPATIENT)
Dept: PRIMARY CARE CLINIC | Age: 51
End: 2021-04-09
Payer: COMMERCIAL

## 2021-04-09 DIAGNOSIS — N52.9 MALE ERECTILE DISORDER: ICD-10-CM

## 2021-04-09 DIAGNOSIS — Z79.4 TYPE 2 DIABETES MELLITUS WITH HYPERGLYCEMIA, WITH LONG-TERM CURRENT USE OF INSULIN (HCC): ICD-10-CM

## 2021-04-09 DIAGNOSIS — E78.2 MIXED HYPERLIPIDEMIA: Primary | ICD-10-CM

## 2021-04-09 DIAGNOSIS — R80.9 PROTEINURIA, UNSPECIFIED TYPE: ICD-10-CM

## 2021-04-09 DIAGNOSIS — E55.9 VITAMIN D DEFICIENCY: ICD-10-CM

## 2021-04-09 DIAGNOSIS — E11.9 TYPE 2 DIABETES MELLITUS WITHOUT COMPLICATION, WITH LONG-TERM CURRENT USE OF INSULIN (HCC): ICD-10-CM

## 2021-04-09 DIAGNOSIS — I10 ESSENTIAL HYPERTENSION: ICD-10-CM

## 2021-04-09 DIAGNOSIS — Z12.5 PROSTATE CANCER SCREENING: ICD-10-CM

## 2021-04-09 DIAGNOSIS — Z79.4 TYPE 2 DIABETES MELLITUS WITHOUT COMPLICATION, WITH LONG-TERM CURRENT USE OF INSULIN (HCC): ICD-10-CM

## 2021-04-09 DIAGNOSIS — Z00.00 HEALTH MAINTENANCE EXAMINATION: ICD-10-CM

## 2021-04-09 DIAGNOSIS — E11.65 TYPE 2 DIABETES MELLITUS WITH HYPERGLYCEMIA, WITH LONG-TERM CURRENT USE OF INSULIN (HCC): ICD-10-CM

## 2021-04-09 PROCEDURE — 3046F HEMOGLOBIN A1C LEVEL >9.0%: CPT | Performed by: FAMILY MEDICINE

## 2021-04-09 PROCEDURE — G8427 DOCREV CUR MEDS BY ELIG CLIN: HCPCS | Performed by: FAMILY MEDICINE

## 2021-04-09 PROCEDURE — 2022F DILAT RTA XM EVC RTNOPTHY: CPT | Performed by: FAMILY MEDICINE

## 2021-04-09 PROCEDURE — 1036F TOBACCO NON-USER: CPT | Performed by: FAMILY MEDICINE

## 2021-04-09 PROCEDURE — G8417 CALC BMI ABV UP PARAM F/U: HCPCS | Performed by: FAMILY MEDICINE

## 2021-04-09 PROCEDURE — 99214 OFFICE O/P EST MOD 30 MIN: CPT | Performed by: FAMILY MEDICINE

## 2021-04-09 RX ORDER — FENOFIBRIC ACID 135 MG/1
135 CAPSULE, DELAYED RELEASE ORAL DAILY
Qty: 90 CAPSULE | Refills: 3 | Status: SHIPPED
Start: 2021-04-09 | End: 2021-09-28 | Stop reason: SDUPTHER

## 2021-04-09 RX ORDER — LISINOPRIL 10 MG/1
10 TABLET ORAL DAILY
Qty: 90 TABLET | Refills: 3 | Status: SHIPPED
Start: 2021-04-09 | End: 2021-09-28 | Stop reason: SDUPTHER

## 2021-04-09 RX ORDER — ATORVASTATIN CALCIUM 40 MG/1
40 TABLET, FILM COATED ORAL DAILY
Qty: 90 TABLET | Refills: 3 | Status: SHIPPED
Start: 2021-04-09 | End: 2021-09-28 | Stop reason: SDUPTHER

## 2021-04-09 SDOH — ECONOMIC STABILITY: INCOME INSECURITY: HOW HARD IS IT FOR YOU TO PAY FOR THE VERY BASICS LIKE FOOD, HOUSING, MEDICAL CARE, AND HEATING?: PATIENT DECLINED

## 2021-04-09 SDOH — ECONOMIC STABILITY: TRANSPORTATION INSECURITY
IN THE PAST 12 MONTHS, HAS LACK OF TRANSPORTATION KEPT YOU FROM MEETINGS, WORK, OR FROM GETTING THINGS NEEDED FOR DAILY LIVING?: PATIENT DECLINED

## 2021-04-09 SDOH — ECONOMIC STABILITY: TRANSPORTATION INSECURITY
IN THE PAST 12 MONTHS, HAS THE LACK OF TRANSPORTATION KEPT YOU FROM MEDICAL APPOINTMENTS OR FROM GETTING MEDICATIONS?: PATIENT DECLINED

## 2021-04-09 ASSESSMENT — PATIENT HEALTH QUESTIONNAIRE - PHQ9: SUM OF ALL RESPONSES TO PHQ9 QUESTIONS 1 & 2: 0

## 2021-04-09 NOTE — PROGRESS NOTES
TeleMedicine Patient Consent    This visit was performed as a virtual video visit using a synchronous, two-way, audio-video telehealth technology platform. Patient identification was verified at the start of the visit, including the patient's telephone number and physical location. I discussed with the patient the nature of our telehealth visits, that:     1. Due to the nature of an audio- video modality, the only components of a physical exam that could be done are the elements supported by direct observation. 2. I would evaluate the patient and recommend diagnostics and treatments based on my assessment. 3. If it was felt that the patient should be evaluated in clinic or an emergency room setting, then they would be directed there. 4. Our sessions are not being recorded and that personal health information is protected. 5. Our team would provide follow up care in person if/when the patient needs it. Patient does agree to proceed with telemedicine consultation. Patient's location: other address in [place] work    Physician  location other address in [place]     Other people involved in call:   None    This visit was completed virtually using Doxy. me    2021    TELEHEALTH EVALUATION -- Audio/Visual (During GPTTU-31 public health emergency)    Chief Complaint   Patient presents with    Discuss Labs           HPI:    Joseph Pizarro (:  1970) has requested an audio/video evaluation for the following concern(s):    Patient presents today for follow-up. Unfortunately been out of his medicine, insulin for about 3 weeks but getting it in the mail today, Metformin for over a week but started again yesterday. Overall feeling well. Business location change. Was stress last year but doing better. Blood pressures been borderline elevated.   Weight went from 100 9215    Blood work reviewed, chloride 97 glucose 313 triglycerides 338 LDL 58 HDL 30 hemoglobin A1c went from 12.7-10.0, TSH 1.830, CBC with differential normal urinalysis shows glucose 30 protein and 208 microalbumin to creatinine ratio    Most Recent Labs  CBC  Lab Results   Component Value Date    WBC 5.2 04/07/2021    WBC 5.4 02/22/2020    WBC 6.0 06/08/2019    RBC 4.96 04/07/2021    RBC 5.04 02/22/2020    RBC 4.94 06/08/2019    HGB 14.2 04/07/2021    HGB 14.3 02/22/2020    HGB 14.2 06/08/2019    HCT 43.5 04/07/2021    HCT 45.5 02/22/2020    HCT 43.9 06/08/2019    MCV 87.7 04/07/2021    MCV 90.3 02/22/2020    MCV 88.9 06/08/2019     04/07/2021     02/22/2020     06/08/2019      CMP  Lab Results   Component Value Date     04/07/2021     02/22/2020     06/08/2019    K 4.7 04/07/2021    K 4.5 03/11/2020    K 5.5 02/22/2020    CL 97 04/07/2021    CL 97 02/22/2020    CL 98 06/08/2019    CO2 23 04/07/2021    CO2 24 02/22/2020    CO2 25 06/08/2019    ANIONGAP 15 04/07/2021    ANIONGAP 14 02/22/2020    ANIONGAP 12 06/08/2019    GLUCOSE 313 04/07/2021    GLUCOSE 314 02/22/2020    GLUCOSE 263 06/08/2019    BUN 15 04/07/2021    BUN 14 02/22/2020    BUN 18 06/08/2019    CREATININE 0.8 04/07/2021    CREATININE 0.7 02/22/2020    CREATININE 0.7 06/08/2019    LABGLOM >60 04/07/2021    LABGLOM >60 02/22/2020    LABGLOM >60 06/08/2019    GFRAA >60 04/07/2021    GFRAA >60 02/22/2020    GFRAA >60 06/08/2019    CALCIUM 9.3 04/07/2021    CALCIUM 9.5 03/11/2020    CALCIUM 10.6 02/22/2020    PROT 7.6 04/07/2021    PROT 7.9 02/22/2020    PROT 8.2 06/08/2019    LABALBU 4.3 04/07/2021    LABALBU 4.7 02/22/2020    LABALBU 4.5 06/08/2019    BILITOT 0.4 04/07/2021    BILITOT 0.3 02/22/2020    BILITOT 0.3 06/08/2019    ALKPHOS 77 04/07/2021    ALKPHOS 97 02/22/2020    ALKPHOS 108 06/08/2019    AST 18 04/07/2021    AST 19 02/22/2020    AST 24 06/08/2019    ALT 26 04/07/2021    ALT 29 02/22/2020    ALT 36 06/08/2019     A1C  Lab Results   Component Value Date    LABA1C 10.0 04/07/2021    LABA1C 12.7 02/22/2020    LABA1C 9.3 06/08/2019 TSH  Lab Results   Component Value Date    TSH 1.830 04/07/2021    TSH 2.830 02/22/2020    TSH 3.220 06/08/2019     FREET4  No results found for: O0ZHQTQ  LIPID  Lab Results   Component Value Date    CHOL 156 04/07/2021    CHOL 167 02/22/2020    CHOL 173 06/08/2019    HDL 30 04/07/2021    HDL 33 02/22/2020    HDL 34 06/08/2019    LDLCALC 58 04/07/2021    LDLCALC 73 02/22/2020    LDLCALC 69 06/08/2019    TRIG 338 04/07/2021    TRIG 307 02/22/2020    TRIG 348 06/08/2019     VITAMIN D  No results found for: VITD25  MAGNESIUM  No results found for: MG   PHOS  No results found for: PHOS   BERNIE   No results found for: BERNIE  RHEUMATOID FACTOR  No results found for: RF  PSA  No results found for: PSA   HEPATITIS C  No results found for: HCVABI  HIV  No results found for: ADF8VDN, HIV1QT  UA  Lab Results   Component Value Date    COLORU Yellow 04/07/2021    COLORU Yellow 02/22/2020    COLORU Yellow 06/08/2019    CLARITYU Clear 04/07/2021    CLARITYU Clear 02/22/2020    CLARITYU Clear 06/08/2019    GLUCOSEU >=1000 04/07/2021    GLUCOSEU >=1000 02/22/2020    GLUCOSEU >=1000 06/08/2019    BILIRUBINUR Negative 04/07/2021    BILIRUBINUR Negative 02/22/2020    BILIRUBINUR Negative 06/08/2019    KETUA Negative 04/07/2021    KETUA Negative 02/22/2020    KETUA Negative 06/08/2019    SPECGRAV 1.020 04/07/2021    SPECGRAV 1.025 02/22/2020    SPECGRAV 1.020 06/08/2019    BLOODU Negative 04/07/2021    BLOODU Negative 02/22/2020    BLOODU Negative 06/08/2019    PHUR 6.5 04/07/2021    PHUR 5.5 02/22/2020    PHUR 5.5 06/08/2019    PROTEINU 30 04/07/2021    PROTEINU Negative 02/22/2020    PROTEINU 30 06/08/2019    UROBILINOGEN 1.0 04/07/2021    UROBILINOGEN 0.2 02/22/2020    UROBILINOGEN 0.2 06/08/2019    NITRU Negative 04/07/2021    NITRU Negative 02/22/2020    NITRU Negative 06/08/2019    LEUKOCYTESUR Negative 04/07/2021    LEUKOCYTESUR Negative 02/22/2020    LEUKOCYTESUR Negative 06/08/2019     Urine Micro/Albumin Ratio  Lab Results Component Value Date    [facility] 208.2 04/07/2021    [facility]8 02/[facility]    [facility] 188.0 06/08/2019           ROS:  Const: Denies chills, fever, malaise and sweats. Eyes: Denies discharge, pain, redness and visual disturbance. ENMT: Denies earaches, other ear symptoms. Denies nasal or sinus symptoms other than stated  above. Denies mouth and tongue lesions and sore throat. CV: Denies chest discomfort, pain; diaphoresis, dizziness, edema, lightheadedness, orthopnea,  palpitations, syncope and near syncopal episode or any exertional symptoms  Resp: Denies cough, hemoptysis, pleuritic pain, SOB, sputum production and wheezing. GI: Denies abdominal pain, change in bowel habits, hematochezia, melena, nausea and vomiting. : Denies urinary symptoms including dysuria , urgency, frequency or hematuria. Musculo: Denies musculoskeletal symptoms. Skin: Denies bruising and rash. Neuro: Denies headache, numbness, stiff neck, tingling and focal weakness slurred speech or facial  droop  Hema/Lymph: Denies bleeding/bruising tendency and enlarged lymph nodes         Current Outpatient Medications:     atorvastatin (LIPITOR) 40 MG tablet, Take 1 tablet by mouth daily, Disp: 90 tablet, Rfl: 3    lisinopril (PRINIVIL;ZESTRIL) 10 MG tablet, Take 1 tablet by mouth daily, Disp: 90 tablet, Rfl: 3    fenofibric acid (FIBRICOR) 135 MG CPDR capsule, Take 1 capsule by mouth daily, Disp: 90 capsule, Rfl: 3    metFORMIN (GLUCOPHAGE) 1000 MG tablet, Take 1 tablet by mouth 2 times daily (with meals), Disp: 180 tablet, Rfl: 3    insulin lispro (HUMALOG KWIKPEN) 200 UNIT/ML SOPN pen, inject 15 units with meals + sliding scale.  MAX 50 units daily, Disp: 25 pen, Rfl: 5    sildenafil (REVATIO) 20 MG tablet, 1-2 po 1/2-4hrs prior to sexual activity, max one dose per day., Disp: 30 tablet, Rfl: 3    Continuous Blood Gluc Sensor (FREESTYLE RAYO 14 DAY SENSOR) MISC, Change sensor every 14 days, Disp: 2 each, Rfl: 5    Insulin Degludec 200 UNIT/ML SOPN, Inject 30 units daily at bedtime, Disp: 20 pen, Rfl: 5    Continuous Blood Gluc Sensor (FREESTYLE KAMRON 14 DAY SENSOR) MISC, CHANGE SENSOR EVERY 14 DAYS, Disp: 3 each, Rfl: 5    Insulin Pen Needle (BD PEN NEEDLE LILLY U/F) 32G X 4 MM MISC, Uses with insulin 4 times a day, Disp: 250 each, Rfl: 5    Continuous Blood Gluc  (FREESTYLE KAMRON 14 DAY READER) MARIELLE, Freestyle Kamron 14 days reader device, Disp: 1 Device, Rfl: 0    aspirin 81 MG tablet, Take 81 mg by mouth daily, Disp: , Rfl:     Omega-3 Fatty Acids (FISH OIL) 1200 MG CAPS, Take by mouth 2 times daily, Disp: , Rfl:   No Known Allergies    Past Medical History:   Diagnosis Date    Encounter for screening colonoscopy 4/22/2019    Erectile dysfunction     Headache     migraine    Hyperlipidemia     Hypertension     Isolated proteinuria     Type 2 diabetes mellitus without complication (HCC)     insulin dependent    Type II or unspecified type diabetes mellitus without mention of complication, not stated as uncontrolled      Past Surgical History:   Procedure Laterality Date    ADENOIDECTOMY AND TYMPANOSTOMY TUBE PLACEMENT      COLONOSCOPY N/A 4/22/2019    COLONOSCOPY WITH BIOPSY performed by Demi Thakkar MD at [place] [address]      age 11 bilateral inguinal    TONSILLECTOMY AND ADENOIDECTOMY      VASECTOMY       Family History   Problem Relation Age of Onset    Thyroid Disease Mother     Diabetes Mother     High Blood Pressure Mother     Arthritis Mother     Cancer Mother     High Cholesterol Mother     Cirrhosis Father     Substance Abuse Father     Diabetes Brother     Diabetes Brother      Social History     Tobacco Use    Smoking status: Former Smoker     Types: Cigarettes     Start date: 12/12/2000    Smokeless tobacco: Former User    Tobacco comment: quit 2001   Substance Use Topics    Alcohol use: Yes     Comment: social    Drug use: No     Social History     Social History Narrative PMH:    Problem List: Essential hypertension, Essential hypertension, Type 2 diabetes mellitus    Health Maintenance:    Influenza Vaccination - (2016)    Medical Problems:    Insulin Dependent Diabetes - DX 2011. Hypertension, Hyperlipidemia    Migraine - neg eval in past    Surgical Hx:    Hernia repair - age 11    T & A, Tympanostomy Tube Insertion            FH:    Father:    . (Hx)    Mother:    . (Hx)    Son 5:    . (Hx)    Grandson 5:    . (Hx)    Dad -  68 cirrhosis, heavy drinker. Mom - thyroid, DM, HTN, colon cancer 64        SH:    . 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 7 moves in 20yrs    Personal Habits: Cigarette Use: Patient smoking status is unknown                 PHYSICAL EXAMINATION:  [ INSTRUCTIONS:  \"[x]\" Indicates a positive item  \"[]\" Indicates a negative item  -- DELETE ALL ITEMS NOT EXAMINED]  Vital Signs: (As obtained by patient/caregiver or practitioner observation)    There were no vitals filed for this visit. Blood pressure-  Heart rate-    Respiratory rate-    Temperature-  Pulse oximetry-     Constitutional: [x] Appears well-developed and well-nourished [x] No apparent distress      [] Abnormal-   Mental status  [x] Alert and awake  [x] Oriented to person/place/time [x]Able to follow commands      Eyes:  EOM    [x]  Normal  [] Abnormal-  Sclera  [x]  Normal  [] Abnormal -         Discharge [x]  None visible  [] Abnormal -    HENT:   [x] Normocephalic, atraumatic.   [] Abnormal   [x] Mouth/Throat: Mucous membranes are moist.     External Ears [x] Normal  [] Abnormal-     Neck: [x] No visualized mass     Pulmonary/Chest: [x] Respiratory effort normal.  [x] No visualized signs of difficulty breathing or respiratory distress        [] Abnormal-      Musculoskeletal:   [x] Normal gait with no signs of ataxia         [x] Normal range of motion of neck        [] Abnormal-       Neurological:        [x] 2/18, 132 on 12/16 and 32 on 9/16, now 208. Increase lisinopril from 5-10. BMP 5 to 7 days. Sugar control. Avoid nephrotoxic agents excessive protein. Proper hydration.     Saw [facility] nephrology in the past.  States it was a waste of time and declines follow-up. Proper hydration reviewed. Avoid nephrotoxic agents. Sugar control emphasized. On ACE inhibitor    Type 2 diabetes mellitus with hyperglycemia, with long-term current use of insulin ([facility] [address]  counseled. Uncontrolled but has been out of his insulin for over 3 weeks, metformin for over a week although started his Metformin last night again and insulin in the mail Supposed to arrive today. following with Dr. Paty Castano. Currently on Tresiba and Humalog plus Metformin. Watch BS ambulatory. Parameters given. Call if not in range. ER if  dangerous numbers. micro-and macrovascular complications reviewed, hyper  hypoglycemic precautions reviewed. Importance of at least daily foot exams and  yearly eye exams reviewed. Risk of dka reviewed. concerning numbers. Risks of severe diabetic complications reviewed gluten debilitating/fatal events. Cautions regarding Metformin reviewed including lactic acidosis, proper hydration reviewed. Mixed hyperlipidemia  Lifestyle modification reviewed. risk of hyperlipidemia reviewed. Other treatment  options reviewed. Consider switching fenofibrate to gemfibrozil. Declines change in  therapy. Risks reviewed including cardio/neurovascular and pancreatitis. Has not taken his  Vascepa for over a year, taking over-the-counter fish oil, pros and cons reviewed. Declines change now. Lifestyle modification emphasized. Sugar control emphasized    Essential hypertension  Elevated. Counseled. The risks of hypertension and hypotension reviewed. Watch closely ambulatory. Hyper and hypotensive precautions and parameters reviewed and written as well as parameters on pulse, call if out of range, ER dangers numbers.  Lifestyle modification reviewed. Increase lisinopril from 5 to 10 mg with precautions including RI, electrolyte imbalance, angioedema, cough. BMP 5 to 7 days. Call if blood pressure uncontrolled    Health maintenance examination  Counseled at length 3/18, encouraged yearly physicals     Male erectile disorder  Counseled extensively. Differential reviewed, including serious etiologies. Treatment  options reviewed. Declines further evaluation. Risks and benefits of Viagra  reviewed and has  with precautions including absolute contra indication with nitrates  and alpha blockers. Counseled on orthostatic precautions. Getting good results with this           Hyperkalemia  Counseled extensively. Differential reviewed, including serious etiologies. Although there have been multiple false positives at the lab recently, the concerns with a truly positive result reviewed. He is asymptomatic. Low potassium intake reviewed. Proper hydration. Discussed adjusting  ACE inhibitor. Repeat normalized. Counseled extensively and differential diagnoses of above were reviewed, including serious etiologies. Side effects and interactions of medications were reviewed. Plan as above:  Also, my concerns with uncontrolled conditions reviewed. Lifestyle modification emphasized. He is behind on follow-up with Dr. Sandra Brock and should follow-up as soon as possible. Increase lisinopril 10 mg. BMP 5 to 7 days call for results follow-up if abnormal as needed otherwise simply wants blood work and follow-up in 3 months sooner as needed. Compliance reviewed. As long as symptoms steadily improve/resolve and medical conditions are following the expected course, FU as below, sooner PRN      Return in about 3 months (around 7/9/2021), or if symptoms worsen or fail to improve.       Time spent: Greater than Not billed by time      Jessy Cherry is a 54 y.o. male being evaluated by a Virtual Visit (video visit) encounter to address concerns as mentioned above. A caregiver was present when appropriate. Due to this being a TeleHealth encounter (During FWNIV-82 public health emergency), evaluation of the following organ systems was limited: Vitals/Constitutional/EENT/Resp/CV/GI//MS/Neuro/Skin/Heme-Lymph-Imm. Pursuant to the emergency declaration under the [address] [place] [address], [address] [place] [address] and the [employer] Act, this Virtual Visit was conducted with patient's (and/or legal guardian's) consent, to reduce the patient's risk of exposure to COVID-19 and provide necessary medical care. The patient (and/or legal guardian) has also been advised to contact this office for worsening conditions or problems, and seek emergency medical treatment and/or call 911 if deemed necessary. Services were provided through a video synchronous discussion virtually to substitute for in-person clinic visit. Various options to be seen in person were discussed. Patients are advised to check with insurance company to ensure coverage and to fully understand benefits and cost prior to any testing to try to avoid unexpected charges. This note was created with the assistance of voice recognition software. Inadvertent errors may be present. Signs and symptoms to watch for were discussed. Serious signs and symptoms reviewed. ER if any    --Judith Dao MD on 4/9/2021 at 2:57 PM    An electronic signature was used to authenticate this note.

## 2021-04-12 RX ORDER — FLASH GLUCOSE SENSOR
KIT MISCELLANEOUS
Qty: 3 EACH | Refills: 5 | Status: SHIPPED
Start: 2021-04-12 | End: 2021-06-16 | Stop reason: SDUPTHER

## 2021-04-19 DIAGNOSIS — E11.65 TYPE 2 DIABETES MELLITUS WITH HYPERGLYCEMIA, WITH LONG-TERM CURRENT USE OF INSULIN (HCC): ICD-10-CM

## 2021-04-19 DIAGNOSIS — Z79.4 TYPE 2 DIABETES MELLITUS WITH HYPERGLYCEMIA, WITH LONG-TERM CURRENT USE OF INSULIN (HCC): ICD-10-CM

## 2021-04-19 LAB
ANION GAP SERPL CALCULATED.3IONS-SCNC: 10 MMOL/L (ref 7–16)
BUN BLDV-MCNC: 17 MG/DL (ref 6–20)
CALCIUM SERPL-MCNC: 10.1 MG/DL (ref 8.6–10.2)
CHLORIDE BLD-SCNC: 99 MMOL/L (ref 98–107)
CO2: 25 MMOL/L (ref 22–29)
CREAT SERPL-MCNC: 0.7 MG/DL (ref 0.7–1.2)
GFR AFRICAN AMERICAN: >60
GFR NON-AFRICAN AMERICAN: >60 ML/MIN/1.73
GLUCOSE BLD-MCNC: 309 MG/DL (ref 74–99)
POTASSIUM SERPL-SCNC: 5.1 MMOL/L (ref 3.5–5)
SODIUM BLD-SCNC: 134 MMOL/L (ref 132–146)

## 2021-05-05 ENCOUNTER — IMMUNIZATION (OUTPATIENT)
Dept: PRIMARY CARE CLINIC | Age: 51
End: 2021-05-05

## 2021-05-05 PROCEDURE — 0012A COVID-19, MODERNA VACCINE 100MCG/0.5ML DOSE: CPT | Performed by: PHYSICIAN ASSISTANT

## 2021-05-05 PROCEDURE — 91301 COVID-19, MODERNA VACCINE 100MCG/0.5ML DOSE: CPT | Performed by: PHYSICIAN ASSISTANT

## 2021-05-09 PROBLEM — Z00.00 HEALTH MAINTENANCE EXAMINATION: Status: RESOLVED | Noted: 2019-07-01 | Resolved: 2021-05-09

## 2021-06-16 DIAGNOSIS — Z79.4 TYPE 2 DIABETES MELLITUS WITHOUT COMPLICATION, WITH LONG-TERM CURRENT USE OF INSULIN (HCC): ICD-10-CM

## 2021-06-16 DIAGNOSIS — E11.9 TYPE 2 DIABETES MELLITUS WITHOUT COMPLICATION, WITH LONG-TERM CURRENT USE OF INSULIN (HCC): ICD-10-CM

## 2021-06-20 RX ORDER — FLASH GLUCOSE SENSOR
KIT MISCELLANEOUS
Qty: 3 EACH | Refills: 5 | Status: SHIPPED
Start: 2021-06-20 | End: 2022-06-09 | Stop reason: ALTCHOICE

## 2021-07-11 DIAGNOSIS — Z79.4 TYPE 2 DIABETES MELLITUS WITH HYPERGLYCEMIA, WITH LONG-TERM CURRENT USE OF INSULIN (HCC): ICD-10-CM

## 2021-07-11 DIAGNOSIS — N52.9 MALE ERECTILE DISORDER: ICD-10-CM

## 2021-07-11 DIAGNOSIS — E11.65 TYPE 2 DIABETES MELLITUS WITH HYPERGLYCEMIA, WITH LONG-TERM CURRENT USE OF INSULIN (HCC): ICD-10-CM

## 2021-07-12 RX ORDER — SILDENAFIL CITRATE 20 MG/1
TABLET ORAL
Qty: 30 TABLET | Refills: 3 | Status: SHIPPED
Start: 2021-07-12 | End: 2021-12-29 | Stop reason: SDUPTHER

## 2021-09-28 DIAGNOSIS — Z79.4 TYPE 2 DIABETES MELLITUS WITHOUT COMPLICATION, WITH LONG-TERM CURRENT USE OF INSULIN (HCC): ICD-10-CM

## 2021-09-28 DIAGNOSIS — Z79.4 TYPE 2 DIABETES MELLITUS WITH HYPERGLYCEMIA, WITH LONG-TERM CURRENT USE OF INSULIN (HCC): ICD-10-CM

## 2021-09-28 DIAGNOSIS — I10 ESSENTIAL HYPERTENSION: ICD-10-CM

## 2021-09-28 DIAGNOSIS — E11.65 TYPE 2 DIABETES MELLITUS WITH HYPERGLYCEMIA, WITH LONG-TERM CURRENT USE OF INSULIN (HCC): ICD-10-CM

## 2021-09-28 DIAGNOSIS — E11.9 TYPE 2 DIABETES MELLITUS WITHOUT COMPLICATION, WITH LONG-TERM CURRENT USE OF INSULIN (HCC): ICD-10-CM

## 2021-09-28 DIAGNOSIS — E78.2 MIXED HYPERLIPIDEMIA: ICD-10-CM

## 2021-09-29 RX ORDER — ATORVASTATIN CALCIUM 40 MG/1
40 TABLET, FILM COATED ORAL DAILY
Qty: 90 TABLET | Refills: 0 | Status: SHIPPED
Start: 2021-09-29 | End: 2021-12-29 | Stop reason: SDUPTHER

## 2021-09-29 RX ORDER — LISINOPRIL 10 MG/1
10 TABLET ORAL DAILY
Qty: 90 TABLET | Refills: 0 | Status: SHIPPED
Start: 2021-09-29 | End: 2021-12-29 | Stop reason: SDUPTHER

## 2021-09-29 RX ORDER — FENOFIBRIC ACID 135 MG/1
135 CAPSULE, DELAYED RELEASE ORAL DAILY
Qty: 90 CAPSULE | Refills: 0 | Status: SHIPPED
Start: 2021-09-29 | End: 2021-12-29 | Stop reason: SDUPTHER

## 2021-09-29 NOTE — TELEPHONE ENCOUNTER
OK, sent. But I do not see a follow-up appointment made, make FU (with labs if applicable) as directed.

## 2021-10-05 RX ORDER — INSULIN LISPRO 200 [IU]/ML
INJECTION, SOLUTION SUBCUTANEOUS
Qty: 25 PEN | Refills: 5 | OUTPATIENT
Start: 2021-10-05

## 2021-12-29 DIAGNOSIS — E78.2 MIXED HYPERLIPIDEMIA: ICD-10-CM

## 2021-12-29 DIAGNOSIS — Z79.4 TYPE 2 DIABETES MELLITUS WITH HYPERGLYCEMIA, WITH LONG-TERM CURRENT USE OF INSULIN (HCC): ICD-10-CM

## 2021-12-29 DIAGNOSIS — Z79.4 TYPE 2 DIABETES MELLITUS WITHOUT COMPLICATION, WITH LONG-TERM CURRENT USE OF INSULIN (HCC): ICD-10-CM

## 2021-12-29 DIAGNOSIS — E11.65 TYPE 2 DIABETES MELLITUS WITH HYPERGLYCEMIA, WITH LONG-TERM CURRENT USE OF INSULIN (HCC): ICD-10-CM

## 2021-12-29 DIAGNOSIS — E11.9 TYPE 2 DIABETES MELLITUS WITHOUT COMPLICATION, WITH LONG-TERM CURRENT USE OF INSULIN (HCC): ICD-10-CM

## 2021-12-29 DIAGNOSIS — I10 ESSENTIAL HYPERTENSION: ICD-10-CM

## 2021-12-29 DIAGNOSIS — N52.9 MALE ERECTILE DISORDER: ICD-10-CM

## 2021-12-29 RX ORDER — INSULIN LISPRO 200 [IU]/ML
INJECTION, SOLUTION SUBCUTANEOUS
Qty: 25 PEN | Refills: 5 | Status: CANCELLED | OUTPATIENT
Start: 2021-12-29

## 2021-12-29 NOTE — TELEPHONE ENCOUNTER
I'm sorry, my mistake. It says insulin last rx'd by dr July Hardy, if seeing endocrinology then they should be managing his diabetic meds. If for some reason he needs bridged, so he doesn't run out, let me know. This includes metformin. Also he requested 2 types of insulin, only one pended but again they should be managing. Let me know.  I have not sent anything yet

## 2021-12-29 NOTE — TELEPHONE ENCOUNTER
Sorry, please confirm 1 more time that this is the correct insulin then, the correct instructions and how many pens it will take for 1 month supply we can give 1 refill. Follow-up with both of us as soon as possible.   Thank you

## 2021-12-29 NOTE — TELEPHONE ENCOUNTER
Please also pend the requested insulin. I will then send all, please have him schedule an appointment with me at least virtual with blood work as ordered if appropriate.

## 2021-12-30 RX ORDER — FENOFIBRIC ACID 135 MG/1
135 CAPSULE, DELAYED RELEASE ORAL DAILY
Qty: 30 CAPSULE | Refills: 0 | Status: SHIPPED
Start: 2021-12-30 | End: 2022-01-13 | Stop reason: SDUPTHER

## 2021-12-30 RX ORDER — LISINOPRIL 10 MG/1
10 TABLET ORAL DAILY
Qty: 30 TABLET | Refills: 0 | Status: SHIPPED
Start: 2021-12-30 | End: 2022-01-13 | Stop reason: SDUPTHER

## 2021-12-30 RX ORDER — ATORVASTATIN CALCIUM 40 MG/1
40 TABLET, FILM COATED ORAL DAILY
Qty: 30 TABLET | Refills: 0 | Status: SHIPPED
Start: 2021-12-30 | End: 2022-01-13 | Stop reason: SDUPTHER

## 2021-12-30 RX ORDER — INSULIN LISPRO 200 [IU]/ML
INJECTION, SOLUTION SUBCUTANEOUS
Qty: 25 PEN | Refills: 0 | Status: SHIPPED
Start: 2021-12-30 | End: 2022-01-13 | Stop reason: SDUPTHER

## 2021-12-30 RX ORDER — SILDENAFIL CITRATE 20 MG/1
TABLET ORAL
Qty: 30 TABLET | Refills: 0 | Status: SHIPPED
Start: 2021-12-30 | End: 2022-01-13 | Stop reason: SDUPTHER

## 2022-01-11 DIAGNOSIS — E55.9 VITAMIN D DEFICIENCY: ICD-10-CM

## 2022-01-11 DIAGNOSIS — Z00.00 HEALTH MAINTENANCE EXAMINATION: ICD-10-CM

## 2022-01-11 DIAGNOSIS — E87.5 HYPERKALEMIA: Primary | ICD-10-CM

## 2022-01-11 DIAGNOSIS — Z79.4 TYPE 2 DIABETES MELLITUS WITH HYPERGLYCEMIA, WITH LONG-TERM CURRENT USE OF INSULIN (HCC): ICD-10-CM

## 2022-01-11 DIAGNOSIS — E11.65 TYPE 2 DIABETES MELLITUS WITH HYPERGLYCEMIA, WITH LONG-TERM CURRENT USE OF INSULIN (HCC): ICD-10-CM

## 2022-01-11 DIAGNOSIS — Z12.5 PROSTATE CANCER SCREENING: ICD-10-CM

## 2022-01-11 DIAGNOSIS — R80.9 PROTEINURIA, UNSPECIFIED TYPE: ICD-10-CM

## 2022-01-11 DIAGNOSIS — I10 ESSENTIAL HYPERTENSION: ICD-10-CM

## 2022-01-11 DIAGNOSIS — N52.9 MALE ERECTILE DISORDER: ICD-10-CM

## 2022-01-11 DIAGNOSIS — E78.2 MIXED HYPERLIPIDEMIA: ICD-10-CM

## 2022-01-11 LAB
ALBUMIN SERPL-MCNC: 4.3 G/DL (ref 3.5–5.2)
ALP BLD-CCNC: 139 U/L (ref 40–129)
ALT SERPL-CCNC: 27 U/L (ref 0–40)
ANION GAP SERPL CALCULATED.3IONS-SCNC: 14 MMOL/L (ref 7–16)
AST SERPL-CCNC: 20 U/L (ref 0–39)
BACTERIA: NORMAL /HPF
BASOPHILS ABSOLUTE: 0.07 E9/L (ref 0–0.2)
BASOPHILS RELATIVE PERCENT: 1.4 % (ref 0–2)
BILIRUB SERPL-MCNC: 0.2 MG/DL (ref 0–1.2)
BILIRUBIN URINE: NEGATIVE
BLOOD, URINE: NEGATIVE
BUN BLDV-MCNC: 16 MG/DL (ref 6–20)
CALCIUM SERPL-MCNC: 10 MG/DL (ref 8.6–10.2)
CHLORIDE BLD-SCNC: 99 MMOL/L (ref 98–107)
CHOLESTEROL, TOTAL: 172 MG/DL (ref 0–199)
CLARITY: CLEAR
CO2: 24 MMOL/L (ref 22–29)
COLOR: YELLOW
CREAT SERPL-MCNC: 0.8 MG/DL (ref 0.7–1.2)
EOSINOPHILS ABSOLUTE: 0.38 E9/L (ref 0.05–0.5)
EOSINOPHILS RELATIVE PERCENT: 7.6 % (ref 0–6)
GFR AFRICAN AMERICAN: >60
GFR NON-AFRICAN AMERICAN: >60 ML/MIN/1.73
GLUCOSE BLD-MCNC: 376 MG/DL (ref 74–99)
GLUCOSE URINE: >=1000 MG/DL
HBA1C MFR BLD: 14.6 % (ref 4–5.6)
HCT VFR BLD CALC: 46.1 % (ref 37–54)
HDLC SERPL-MCNC: 35 MG/DL
HEMOGLOBIN: 15 G/DL (ref 12.5–16.5)
IMMATURE GRANULOCYTES #: 0.02 E9/L
IMMATURE GRANULOCYTES %: 0.4 % (ref 0–5)
KETONES, URINE: NEGATIVE MG/DL
LDL CHOLESTEROL CALCULATED: 84 MG/DL (ref 0–99)
LEUKOCYTE ESTERASE, URINE: NEGATIVE
LYMPHOCYTES ABSOLUTE: 1.65 E9/L (ref 1.5–4)
LYMPHOCYTES RELATIVE PERCENT: 32.8 % (ref 20–42)
MCH RBC QN AUTO: 29.4 PG (ref 26–35)
MCHC RBC AUTO-ENTMCNC: 32.5 % (ref 32–34.5)
MCV RBC AUTO: 90.2 FL (ref 80–99.9)
MONOCYTES ABSOLUTE: 0.32 E9/L (ref 0.1–0.95)
MONOCYTES RELATIVE PERCENT: 6.4 % (ref 2–12)
NEUTROPHILS ABSOLUTE: 2.59 E9/L (ref 1.8–7.3)
NEUTROPHILS RELATIVE PERCENT: 51.4 % (ref 43–80)
NITRITE, URINE: NEGATIVE
PDW BLD-RTO: 12.8 FL (ref 11.5–15)
PH UA: 6 (ref 5–9)
PLATELET # BLD: 215 E9/L (ref 130–450)
PMV BLD AUTO: 11 FL (ref 7–12)
POTASSIUM SERPL-SCNC: 5.6 MMOL/L (ref 3.5–5)
PROSTATE SPECIFIC ANTIGEN: 0.59 NG/ML (ref 0–4)
PROTEIN UA: ABNORMAL MG/DL
RBC # BLD: 5.11 E12/L (ref 3.8–5.8)
RBC UA: NORMAL /HPF (ref 0–2)
SODIUM BLD-SCNC: 137 MMOL/L (ref 132–146)
SPECIFIC GRAVITY UA: 1.01 (ref 1–1.03)
TOTAL CK: 163 U/L (ref 20–200)
TOTAL PROTEIN: 7.6 G/DL (ref 6.4–8.3)
TRIGL SERPL-MCNC: 263 MG/DL (ref 0–149)
TSH SERPL DL<=0.05 MIU/L-ACNC: 2.57 UIU/ML (ref 0.27–4.2)
UROBILINOGEN, URINE: 0.2 E.U./DL
VITAMIN D 25-HYDROXY: 25 NG/ML (ref 30–100)
VLDLC SERPL CALC-MCNC: 53 MG/DL
WBC # BLD: 5 E9/L (ref 4.5–11.5)
WBC UA: NORMAL /HPF (ref 0–5)

## 2022-01-13 RX ORDER — LISINOPRIL 10 MG/1
10 TABLET ORAL DAILY
Qty: 30 TABLET | Refills: 1 | Status: SHIPPED
Start: 2022-01-13 | End: 2022-04-19 | Stop reason: SDUPTHER

## 2022-01-13 RX ORDER — FENOFIBRIC ACID 135 MG/1
135 CAPSULE, DELAYED RELEASE ORAL DAILY
Qty: 30 CAPSULE | Refills: 1 | Status: SHIPPED
Start: 2022-01-13 | End: 2022-04-19 | Stop reason: SDUPTHER

## 2022-01-13 RX ORDER — INSULIN LISPRO 200 [IU]/ML
INJECTION, SOLUTION SUBCUTANEOUS
Qty: 25 PEN | Refills: 1 | Status: SHIPPED
Start: 2022-01-13 | End: 2022-06-09 | Stop reason: ALTCHOICE

## 2022-01-13 RX ORDER — ATORVASTATIN CALCIUM 40 MG/1
40 TABLET, FILM COATED ORAL DAILY
Qty: 30 TABLET | Refills: 1 | Status: SHIPPED
Start: 2022-01-13 | End: 2022-04-19 | Stop reason: SDUPTHER

## 2022-01-13 RX ORDER — SILDENAFIL CITRATE 20 MG/1
TABLET ORAL
Qty: 30 TABLET | Refills: 1 | Status: SHIPPED
Start: 2022-01-13 | End: 2022-04-19 | Stop reason: SDUPTHER

## 2022-04-19 DIAGNOSIS — N52.9 MALE ERECTILE DISORDER: ICD-10-CM

## 2022-04-19 DIAGNOSIS — Z79.4 TYPE 2 DIABETES MELLITUS WITH HYPERGLYCEMIA, WITH LONG-TERM CURRENT USE OF INSULIN (HCC): ICD-10-CM

## 2022-04-19 DIAGNOSIS — E78.2 MIXED HYPERLIPIDEMIA: ICD-10-CM

## 2022-04-19 DIAGNOSIS — I10 ESSENTIAL HYPERTENSION: ICD-10-CM

## 2022-04-19 DIAGNOSIS — E11.65 TYPE 2 DIABETES MELLITUS WITH HYPERGLYCEMIA, WITH LONG-TERM CURRENT USE OF INSULIN (HCC): ICD-10-CM

## 2022-04-20 RX ORDER — ATORVASTATIN CALCIUM 40 MG/1
40 TABLET, FILM COATED ORAL DAILY
Qty: 30 TABLET | Refills: 1 | Status: SHIPPED
Start: 2022-04-20 | End: 2022-06-09 | Stop reason: SDUPTHER

## 2022-04-20 RX ORDER — LISINOPRIL 10 MG/1
10 TABLET ORAL DAILY
Qty: 30 TABLET | Refills: 1 | Status: SHIPPED
Start: 2022-04-20 | End: 2022-06-09 | Stop reason: SDUPTHER

## 2022-04-20 RX ORDER — FENOFIBRIC ACID 135 MG/1
135 CAPSULE, DELAYED RELEASE ORAL DAILY
Qty: 30 CAPSULE | Refills: 1 | Status: SHIPPED
Start: 2022-04-20 | End: 2022-06-09 | Stop reason: SDUPTHER

## 2022-04-20 RX ORDER — SILDENAFIL CITRATE 20 MG/1
TABLET ORAL
Qty: 30 TABLET | Refills: 1 | Status: SHIPPED
Start: 2022-04-20 | End: 2022-06-09 | Stop reason: SDUPTHER

## 2022-04-20 NOTE — TELEPHONE ENCOUNTER
Sent, but as mentioned last refill request, long overdue on follow-up. Was supposed to follow-up July 2021. Needs to schedule.   At least virtual

## 2022-06-08 DIAGNOSIS — Z79.4 TYPE 2 DIABETES MELLITUS WITH HYPERGLYCEMIA, WITH LONG-TERM CURRENT USE OF INSULIN (HCC): ICD-10-CM

## 2022-06-08 DIAGNOSIS — E78.2 MIXED HYPERLIPIDEMIA: ICD-10-CM

## 2022-06-08 DIAGNOSIS — E11.65 TYPE 2 DIABETES MELLITUS WITH HYPERGLYCEMIA, WITH LONG-TERM CURRENT USE OF INSULIN (HCC): ICD-10-CM

## 2022-06-08 DIAGNOSIS — I10 ESSENTIAL HYPERTENSION: ICD-10-CM

## 2022-06-08 DIAGNOSIS — E55.9 VITAMIN D DEFICIENCY: ICD-10-CM

## 2022-06-08 LAB
ALBUMIN SERPL-MCNC: 4.4 G/DL (ref 3.5–5.2)
ALP BLD-CCNC: 127 U/L (ref 40–129)
ALT SERPL-CCNC: 38 U/L (ref 0–40)
ANION GAP SERPL CALCULATED.3IONS-SCNC: 16 MMOL/L (ref 7–16)
AST SERPL-CCNC: 32 U/L (ref 0–39)
BACTERIA: NORMAL /HPF
BASOPHILS ABSOLUTE: 0.07 E9/L (ref 0–0.2)
BASOPHILS RELATIVE PERCENT: 1.4 % (ref 0–2)
BILIRUB SERPL-MCNC: <0.2 MG/DL (ref 0–1.2)
BILIRUBIN URINE: NEGATIVE
BLOOD, URINE: NEGATIVE
BUN BLDV-MCNC: 14 MG/DL (ref 6–20)
CALCIUM SERPL-MCNC: 9.6 MG/DL (ref 8.6–10.2)
CHLORIDE BLD-SCNC: 96 MMOL/L (ref 98–107)
CHOLESTEROL, TOTAL: 250 MG/DL (ref 0–199)
CLARITY: CLEAR
CO2: 21 MMOL/L (ref 22–29)
COLOR: YELLOW
CREAT SERPL-MCNC: 0.7 MG/DL (ref 0.7–1.2)
CREATININE URINE: 68 MG/DL (ref 40–278)
EOSINOPHILS ABSOLUTE: 0.17 E9/L (ref 0.05–0.5)
EOSINOPHILS RELATIVE PERCENT: 3.5 % (ref 0–6)
GFR AFRICAN AMERICAN: >60
GFR NON-AFRICAN AMERICAN: >60 ML/MIN/1.73
GLUCOSE BLD-MCNC: 359 MG/DL (ref 74–99)
GLUCOSE URINE: >=1000 MG/DL
HBA1C MFR BLD: 12.3 % (ref 4–5.6)
HCT VFR BLD CALC: 42.5 % (ref 37–54)
HDLC SERPL-MCNC: 31 MG/DL
HEMOGLOBIN: 14 G/DL (ref 12.5–16.5)
IMMATURE GRANULOCYTES #: 0.01 E9/L
IMMATURE GRANULOCYTES %: 0.2 % (ref 0–5)
KETONES, URINE: NEGATIVE MG/DL
LDL CHOLESTEROL CALCULATED: ABNORMAL MG/DL (ref 0–99)
LEUKOCYTE ESTERASE, URINE: NEGATIVE
LYMPHOCYTES ABSOLUTE: 1.75 E9/L (ref 1.5–4)
LYMPHOCYTES RELATIVE PERCENT: 36.1 % (ref 20–42)
MCH RBC QN AUTO: 29.1 PG (ref 26–35)
MCHC RBC AUTO-ENTMCNC: 32.9 % (ref 32–34.5)
MCV RBC AUTO: 88.4 FL (ref 80–99.9)
MICROALBUMIN UR-MCNC: 180.6 MG/L
MICROALBUMIN/CREAT UR-RTO: 265.6 (ref 0–30)
MONOCYTES ABSOLUTE: 0.35 E9/L (ref 0.1–0.95)
MONOCYTES RELATIVE PERCENT: 7.2 % (ref 2–12)
NEUTROPHILS ABSOLUTE: 2.5 E9/L (ref 1.8–7.3)
NEUTROPHILS RELATIVE PERCENT: 51.6 % (ref 43–80)
NITRITE, URINE: NEGATIVE
PDW BLD-RTO: 12.9 FL (ref 11.5–15)
PH UA: 6 (ref 5–9)
PLATELET # BLD: 189 E9/L (ref 130–450)
PMV BLD AUTO: 11.4 FL (ref 7–12)
POTASSIUM SERPL-SCNC: 4.9 MMOL/L (ref 3.5–5)
PROTEIN UA: 30 MG/DL
RBC # BLD: 4.81 E12/L (ref 3.8–5.8)
RBC UA: NORMAL /HPF (ref 0–2)
SODIUM BLD-SCNC: 133 MMOL/L (ref 132–146)
SPECIFIC GRAVITY UA: 1.01 (ref 1–1.03)
TOTAL CK: 119 U/L (ref 20–200)
TOTAL PROTEIN: 7.3 G/DL (ref 6.4–8.3)
TRIGL SERPL-MCNC: 856 MG/DL (ref 0–149)
TSH SERPL DL<=0.05 MIU/L-ACNC: 2.64 UIU/ML (ref 0.27–4.2)
UROBILINOGEN, URINE: 0.2 E.U./DL
VITAMIN D 25-HYDROXY: 31 NG/ML (ref 30–100)
VLDLC SERPL CALC-MCNC: ABNORMAL MG/DL
WBC # BLD: 4.9 E9/L (ref 4.5–11.5)
WBC UA: NORMAL /HPF (ref 0–5)

## 2022-06-08 NOTE — RESULT ENCOUNTER NOTE
Sugar and triglycerides continue to be extremely high with continued significant protein in urine indicative of kidney stress from diabetes. Make sure following with endocrinologist.  Imperative.   Keep follow-up with me as well

## 2022-06-09 ENCOUNTER — OFFICE VISIT (OUTPATIENT)
Dept: PRIMARY CARE CLINIC | Age: 52
End: 2022-06-09
Payer: COMMERCIAL

## 2022-06-09 VITALS
WEIGHT: 216 LBS | DIASTOLIC BLOOD PRESSURE: 80 MMHG | TEMPERATURE: 97.6 F | HEIGHT: 68 IN | HEART RATE: 88 BPM | SYSTOLIC BLOOD PRESSURE: 134 MMHG | BODY MASS INDEX: 32.74 KG/M2 | OXYGEN SATURATION: 98 %

## 2022-06-09 DIAGNOSIS — R80.9 PROTEINURIA, UNSPECIFIED TYPE: ICD-10-CM

## 2022-06-09 DIAGNOSIS — E78.2 MIXED HYPERLIPIDEMIA: ICD-10-CM

## 2022-06-09 DIAGNOSIS — E55.9 VITAMIN D DEFICIENCY: ICD-10-CM

## 2022-06-09 DIAGNOSIS — R42 DIZZINESS: Primary | ICD-10-CM

## 2022-06-09 DIAGNOSIS — N52.9 MALE ERECTILE DISORDER: ICD-10-CM

## 2022-06-09 DIAGNOSIS — Z79.4 TYPE 2 DIABETES MELLITUS WITH HYPERGLYCEMIA, WITH LONG-TERM CURRENT USE OF INSULIN (HCC): ICD-10-CM

## 2022-06-09 DIAGNOSIS — E11.65 TYPE 2 DIABETES MELLITUS WITH HYPERGLYCEMIA, WITH LONG-TERM CURRENT USE OF INSULIN (HCC): ICD-10-CM

## 2022-06-09 DIAGNOSIS — I10 ESSENTIAL HYPERTENSION: ICD-10-CM

## 2022-06-09 PROCEDURE — 99215 OFFICE O/P EST HI 40 MIN: CPT | Performed by: FAMILY MEDICINE

## 2022-06-09 PROCEDURE — 93000 ELECTROCARDIOGRAM COMPLETE: CPT | Performed by: FAMILY MEDICINE

## 2022-06-09 PROCEDURE — 3046F HEMOGLOBIN A1C LEVEL >9.0%: CPT | Performed by: FAMILY MEDICINE

## 2022-06-09 RX ORDER — OMEGA-3-ACID ETHYL ESTERS 1 G/1
2 CAPSULE, LIQUID FILLED ORAL 2 TIMES DAILY
Qty: 120 CAPSULE | Refills: 1 | Status: SHIPPED
Start: 2022-06-09 | End: 2022-08-17 | Stop reason: SDUPTHER

## 2022-06-09 RX ORDER — FENOFIBRIC ACID 135 MG/1
135 CAPSULE, DELAYED RELEASE ORAL DAILY
Qty: 90 CAPSULE | Refills: 3 | Status: SHIPPED | OUTPATIENT
Start: 2022-06-09

## 2022-06-09 RX ORDER — LISINOPRIL 10 MG/1
10 TABLET ORAL DAILY
Qty: 90 TABLET | Refills: 3 | Status: SHIPPED | OUTPATIENT
Start: 2022-06-09

## 2022-06-09 RX ORDER — ATORVASTATIN CALCIUM 40 MG/1
40 TABLET, FILM COATED ORAL DAILY
Qty: 90 TABLET | Refills: 3 | Status: SHIPPED | OUTPATIENT
Start: 2022-06-09

## 2022-06-09 RX ORDER — SILDENAFIL CITRATE 20 MG/1
TABLET ORAL
Qty: 30 TABLET | Refills: 3 | Status: SHIPPED | OUTPATIENT
Start: 2022-06-09

## 2022-06-09 ASSESSMENT — PATIENT HEALTH QUESTIONNAIRE - PHQ9
2. FEELING DOWN, DEPRESSED OR HOPELESS: 0
SUM OF ALL RESPONSES TO PHQ9 QUESTIONS 1 & 2: 0
1. LITTLE INTEREST OR PLEASURE IN DOING THINGS: 0
SUM OF ALL RESPONSES TO PHQ QUESTIONS 1-9: 0

## 2022-06-09 NOTE — PROGRESS NOTES
Evan Chan : 1970 Sex: male  Age: 50 y.o. Chief Complaint   Patient presents with    Medication Refill       HPI  HPI:      Patient presents today for follow-up last seen virtually 2021, last seen in person 3/12/2020. Generally feels well. He has endocrinologist but overdue on follow-up. Imperative he contact them and follow-up ASAP. He states he was unable to schedule online but he will call. Has been out of his sliding scale because of cost, he will contact them to find equivalent. Using his [place]. Sugars are extremely uncontrolled. Risk of this both macrovascular and microvascular reviewed and he understands. Ran out of all of his medicines a few days or week prior to labs. 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 He wonders if it is his lisinopril. He does not have his blood pressure cuff handy, recommended he bring it to check his blood pressure to check for hypotension, discussed cutting lisinopril in half. Discussed anginal equivalent symptoms. EKG done and reviewed with him. He is willing to see cardiologist on a nonemergent outpatient basis. Defers other holloway at this time    We again reiterated my concern with his uncontrolled diabetes and understands the risk of sudden DF events. Blood work reviewed, CMP shows chloride 96 CO2 21 glucose 359 otherwise normal, triglyceride extremely elevated 856 HDL 31,  LDL was 84 hemoglobin A1c went from 14.6-12.3 TSH 2.6 vitamin D 31 CBC with differential normal urinalysis shows greater than 1000 glucose, 30 protein with 265 microalbumin creatinine ratio.   Saw nephrologist in the past, felt it was a waste    Most Recent Labs  CBC  Lab Results   Component Value Date    WBC 4.9 2022    WBC 5.0 2022    WBC 5.2 2021    RBC 4.81 2022    RBC 5.11 2022 RBC 4.96 04/07/2021    HGB 14.0 06/08/2022    HGB 15.0 01/11/2022    HGB 14.2 04/07/2021    HCT 42.5 06/08/2022    HCT 46.1 01/11/2022    HCT 43.5 04/07/2021    MCV 88.4 06/08/2022    MCV 90.2 01/11/2022    MCV 87.7 04/07/2021     06/08/2022     01/11/2022     04/07/2021      CMP  Lab Results   Component Value Date     06/08/2022     01/11/2022     04/19/2021    K 4.9 06/08/2022    K 5.6 01/11/2022    K 5.1 04/19/2021    CL 96 06/08/2022    CL 99 01/11/2022    CL 99 04/19/2021    CO2 21 06/08/2022    CO2 24 01/11/2022    CO2 25 04/19/2021    ANIONGAP 16 06/08/2022    ANIONGAP 14 01/11/2022    ANIONGAP 10 04/19/2021    GLUCOSE 359 06/08/2022    GLUCOSE 376 01/11/2022    GLUCOSE 309 04/19/2021    BUN 14 06/08/2022    BUN 16 01/11/2022    BUN 17 04/19/2021    CREATININE 0.7 06/08/2022    CREATININE 0.8 01/11/2022    CREATININE 0.7 04/19/2021    LABGLOM >60 06/08/2022    LABGLOM >60 01/11/2022    LABGLOM >60 04/19/2021    GFRAA >60 06/08/2022    GFRAA >60 01/11/2022    GFRAA >60 04/19/2021    CALCIUM 9.6 06/08/2022    CALCIUM 10.0 01/11/2022    CALCIUM 10.1 04/19/2021    PROT 7.3 06/08/2022    PROT 7.6 01/11/2022    PROT 7.6 04/07/2021    LABALBU 4.4 06/08/2022    LABALBU 4.3 01/11/2022    LABALBU 4.3 04/07/2021    BILITOT <0.2 06/08/2022    BILITOT 0.2 01/11/2022    BILITOT 0.4 04/07/2021    ALKPHOS 127 06/08/2022    ALKPHOS 139 01/11/2022    ALKPHOS 77 04/07/2021    AST 32 06/08/2022    AST 20 01/11/2022    AST 18 04/07/2021    ALT 38 06/08/2022    ALT 27 01/11/2022    ALT 26 04/07/2021     A1C  Lab Results   Component Value Date    LABA1C 12.3 06/08/2022    LABA1C 14.6 01/11/2022    LABA1C 10.0 04/07/2021     TSH  Lab Results   Component Value Date    TSH 2.640 06/08/2022    TSH 2.570 01/11/2022    TSH 1.830 04/07/2021     FREET4  No results found for: I4SQRLK  LIPID  Lab Results   Component Value Date    CHOL 250 06/08/2022    CHOL 172 01/11/2022    CHOL 156 04/07/2021 HDL 31 06/08/2022    HDL 35 01/11/2022    HDL 30 04/07/2021    LDLCALC - 06/08/2022    LDLCALC 84 01/11/2022    LDLCALC 58 04/07/2021    TRIG 856 06/08/2022    TRIG 263 01/11/2022    TRIG 338 04/07/2021     VITAMIN D  Lab Results   Component Value Date    VITD25 31 06/08/2022    VITD25 25 01/11/2022     MAGNESIUM  No results found for: MG   PHOS  No results found for: PHOS   BERNIE   No results found for: BERNIE  RHEUMATOID FACTOR  No results found for: RF  PSA  Lab Results   Component Value Date    PSA 0.59 01/11/2022      HEPATITIS C  No results found for: HCVABI  HIV  No results found for: KPD7IRF, HIV1QT  UA  Lab Results   Component Value Date    COLORU Yellow 06/08/2022    COLORU Yellow 01/11/2022    COLORU Yellow 04/07/2021    CLARITYU Clear 06/08/2022    CLARITYU Clear 01/11/2022    CLARITYU Clear 04/07/2021    GLUCOSEU >=1000 06/08/2022    GLUCOSEU >=1000 01/11/2022    GLUCOSEU >=1000 04/07/2021    BILIRUBINUR Negative 06/08/2022    BILIRUBINUR Negative 01/11/2022    BILIRUBINUR Negative 04/07/2021    KETUA Negative 06/08/2022    KETUA Negative 01/11/2022    KETUA Negative 04/07/2021    SPECGRAV 1.015 06/08/2022    SPECGRAV 1.015 01/11/2022    SPECGRAV 1.020 04/07/2021    BLOODU Negative 06/08/2022    BLOODU Negative 01/11/2022    BLOODU Negative 04/07/2021    PHUR 6.0 06/08/2022    PHUR 6.0 01/11/2022    PHUR 6.5 04/07/2021    PROTEINU 30 06/08/2022    PROTEINU TRACE 01/11/2022    PROTEINU 30 04/07/2021    UROBILINOGEN 0.2 06/08/2022    UROBILINOGEN 0.2 01/11/2022    UROBILINOGEN 1.0 04/07/2021    NITRU Negative 06/08/2022    NITRU Negative 01/11/2022    NITRU Negative 04/07/2021    LEUKOCYTESUR Negative 06/08/2022    LEUKOCYTESUR Negative 01/11/2022    LEUKOCYTESUR Negative 04/07/2021     Urine Micro/Albumin Ratio  Lab Results   Component Value Date    MALBCR 265.6 06/08/2022    MALBCR 208.2 04/07/2021    MALBCR 123.8 02/22/2020         Review of Systems  ROS:  Const: Denies chills, fever, malaise and sweats. Eyes: Denies discharge, pain, redness and visual disturbance. ENMT: Denies earaches, other ear symptoms. Denies nasal or sinus symptoms other than stated  above. Denies mouth and tongue lesions and sore throat. CV: Denies chest discomfort, pain; diaphoresis, dizziness, edema, lightheadedness, orthopnea,  palpitations, syncope and near syncopal episode or any exertional symptoms  Resp: Denies cough, hemoptysis, pleuritic pain, SOB, sputum production and wheezing. GI: Denies abdominal pain, change in bowel habits, hematochezia, melena, nausea and vomiting. : Denies urinary symptoms including dysuria , urgency, frequency or hematuria. Musculo: Denies musculoskeletal symptoms. Skin: Denies bruising and rash.   Neuro: Denies headache, numbness, stiff neck, tingling and focal weakness slurred speech or facial  droop  Hema/Lymph: Denies bleeding/bruising tendency and enlarged lymph nodes        Current Outpatient Medications:     omega-3 acid ethyl esters (LOVAZA) 1 g capsule, Take 2 capsules by mouth 2 times daily, Disp: 120 capsule, Rfl: 1    atorvastatin (LIPITOR) 40 MG tablet, Take 1 tablet by mouth daily, Disp: 90 tablet, Rfl: 3    fenofibric acid (TRILIPIX) 135 MG CPDR capsule, Take 1 capsule by mouth daily, Disp: 90 capsule, Rfl: 3    lisinopril (PRINIVIL;ZESTRIL) 10 MG tablet, Take 1 tablet by mouth daily, Disp: 90 tablet, Rfl: 3    metFORMIN (GLUCOPHAGE) 1000 MG tablet, Take 1 tablet by mouth 2 times daily (with meals), Disp: 180 tablet, Rfl: 3    sildenafil (REVATIO) 20 MG tablet, 1-2 po 1/2-4hrs prior to sexual activity, max one dose per day., Disp: 30 tablet, Rfl: 3    Insulin Degludec 200 UNIT/ML SOPN, Inject 30 units daily at bedtime, Disp: 20 pen, Rfl: 1    Insulin Pen Needle (BD PEN NEEDLE LILLY U/F) 32G X 4 MM MISC, Uses with insulin 4 times a day, Disp: 250 each, Rfl: 5    aspirin 81 MG tablet, Take 81 mg by mouth daily, Disp: , Rfl:   No Known Allergies    Past Medical History: manager; now owns [employer]    moved from [facility]; [address] [facility] up [place] and [place]. 7 moves in 20yrs    Personal Habits: Cigarette Use: Patient smoking status is unknown        Vitals:    06/09/22 1437   BP: 134/80   Pulse: 88   Temp: 97.6 °F (36.4 °C)   SpO2: 98%   Weight: 216 lb (98 kg)   Height: 5' 8\" (1.727 m)      Wt Readings from Last 3 Encounters:   06/09/22 216 lb (98 kg)   05/26/20 204 lb (92.5 kg)   03/12/20 200 lb (90.7 kg)        Physical Exam  Exam:  Const: Appears comfortable. No signs of acute distress present. Head/Face: Atraumatic on inspection. Eyes: EOMI in both eyes. No discharge from the eyes. PERRL. Sclerae clear. ENMT: Auditory canals normal. Tympanic membranes: intact and translucent. External nose WNL. Nasal mucosa is clear. Oropharynx: No erythema or exudate. Posterior pharynx is normal.  Neck: Supple. Palpation reveals no adenopathy. No masses appreciated. No JVD. Carotids: no  bruits. Resp: Respirations are unlabored. Clear to auscultation. No rales, rhonchi or wheezes appreciated  over the lungs bilaterally. CV: Rate is regular. Rhythm is regular. No gallop or rubs. No heart murmur appreciated. Extremities: No clubbing, cyanosis, or edema. No calf inflammation or tenderness. Abdomen: Bowel sounds are normoactive. Abdomen is soft, nontender, and nondistended. No  abdominal masses. No palpable hepatosplenomegaly. Lymph: No palpable or visible regional lymphadenopathy. Musculoskeletal: no acute joint inflammation. Skin: Dry and warm with no rash. Skin normal to inspection and palpation overall. Neuro: Alert and oriented. Affect: appropriate. Upper Extremities: 5/5 bilaterally. Lower Extremities:  5/5 bilaterally. Sensation intact to light touch. Reflexes: DTR's are symmetric and 2+ bilaterally. .  Cranial Nerves: Cranial nerves grossly intact. Assessment and Plan:   Diagnosis Orders   1. Dizziness  EKG 12 Lead    Ambulatory referral to Cardiology   2.  Mixed hyperlipidemia omega-3 acid ethyl esters (LOVAZA) 1 g capsule    atorvastatin (LIPITOR) 40 MG tablet    fenofibric acid (TRILIPIX) 135 MG CPDR capsule    Lipid Panel    Comprehensive Metabolic Panel    CK   3. Type 2 diabetes mellitus with hyperglycemia, with long-term current use of insulin (HCC)  lisinopril (PRINIVIL;ZESTRIL) 10 MG tablet    metFORMIN (GLUCOPHAGE) 1000 MG tablet   4. Essential hypertension  lisinopril (PRINIVIL;ZESTRIL) 10 MG tablet   5. Male erectile disorder  sildenafil (REVATIO) 20 MG tablet   6. Proteinuria, unspecified type     7. Vitamin D deficiency           Dizziness  Counseled extensively. Differential reviewed, including serious etiologies. Differential vast especially with uncontrolled diabetes, cannot rule out anginal equivalent. EKG done reviewed with him. Risk-benefit Mohinder reviewed. Instead of taking baby aspirin has been taking Excedrin in the morning, counseled against NSAIDs but again counseled on pros and cons of baby aspirin both preventative and therapeutic doses. Willing to see cardiologist on a nonurgent outpatient basis for stress test.  Not interested in carotid ultrasound or other holloway now. He should monitor his blood pressure. He is going to consider reducing lisinopril dose if in fact hypotensive. Also sugar control emphasized. Vitamin D deficiency  Currently normal at 32, continue current supplementation        Hypercalcemia  Counseled extensively. Differential reviewed, including serious etiologies. Previously admitted to 2000 spencer a day of whole milk. Counseled. Repeat normalized. Proteinuria  Microalbumin to creatinine ratio was 188 on 7/19 - 123 2/20. His microalbumin to creatinine ratio was 26 on 2/19, 24 on 2/18, 132 on 12/16 and 32 on 9/16, now 208 4/21 and 265 on 6/22. .  Increase lisinopril from 5-10. BMP 5 to 7 days. Sugar control emphasized. Avoid nephrotoxic agents, including NSAIDs and excessive protein.   Proper hydration.     Saw [facility] nephrology in Counseled. Refill 10 mg for now. The risks of hypertension and hypotension reviewed. Watch closely ambulatory. Hyper and hypotensive precautions and parameters reviewed and written as well as parameters on pulse, call if out of range, ER dangers numbers. Lifestyle modification reviewed. standard precautions of lisinopril reviewed including RI, electrolyte imbalance, angioedema, cough. Health maintenance examination  Counseled at length 3/18, encouraged yearly physicals     Male erectile disorder  Counseled extensively. Differential reviewed, including serious etiologies. Treatment  options reviewed. Declines further evaluation. Risks and benefits of Viagra  reviewed and has  with precautions including absolute contra indication with nitrates  and alpha blockers. Counseled on orthostatic precautions. Getting good results with this, refilled           Hyperkalemia  Counseled extensively. Differential reviewed, including serious etiologies. Although there have been multiple false positives at the lab recently, the concerns with a truly positive result reviewed. He is asymptomatic. Low potassium intake reviewed. Proper hydration. Discussed adjusting  ACE inhibitor. Repeat normalized. Counseled extensively and differential diagnoses of above were reviewed, including serious etiologies. Side effects and interactions of medications were reviewed. No flowsheet data found. Plan as above. Counseled extensively and differential diagnoses relevant to above were reviewed, including serious etiologies, risks and complications, especially of left uncontrolled. If relevant, instructions and  alternatives to meds/treatment reviewed, as well as interactions, and  SE's/ADRs reviewed, notify immediately if any, discontinuing new meds if any. Plan made after discussion and shared decision making.     In summary, I do have significant concerns with his uncontrolled diabetes, uncontrolled hyperlipidemia and proteinuria. Lifestyle modification emphasized. He will contact endocrinologist when we are finished his he has been out of sliding scale insulin because it is unaffordable, he will discuss with them switching to a formulary equivalent. Follow-up with them ASAP. Refer to cardiology. EKG done and reviewed. Start Lovaza with precautions. Declines nephrology. Healthy diet, exercise, proper hydration, avoid nephrotoxic agents such as NSAIDs. Blood work and follow-up 4 to 5 weeks sooner as needed. Plan physical then. Refilled other meds. Defers other holloway            As long as symptoms steadily improve/resolve, and medical conditions follow the expected course, FU as below, sooner PRN. Return in about 5 weeks (around 7/14/2022) for physical.                 Educational materials and/or home exercises printed for patient's review and were included in patient instructions on his/her After Visit Summary and given to patient at the end of visit. After discussion, patient and/or guardian verbalizes understanding, agrees, feels comfortable with and wishes to proceed with above treatment plan. Call for any pending results, FU sooner if abnormal, as needed or if any current symptoms persist/worsen. Advised patient to call with any new medication issues, and read all Rx info from pharmacy to assure aware of all possible risks and side effects of medication before taking. Reviewed age and gender appropriate health screening exams and vaccinations. Advised patient regarding importance of keeping up with recommended health maintenance and to schedule as soon as possible if overdue, as this is important in assessing for undiagnosed pathology, especially cancer, as well as protecting against potentially harmful/life threatening disease. Patient and/or guardian verbalizes understanding and agrees with above counseling, assessment and plan.      All questions answered. Signs and symptoms to watch for discussed, serious signs and symptoms reviewed. ER if any. Over 44 minutes  spent with the patient in reviewing records, reviewing with patient/family, counseling, ordering,  prescribing, completing h&p, etc., with over 50% of the time spent face to face counseling. Helen Becerra MD    Patients are advised to check with insurance company to ensure coverage and to fully understand benefits and cost prior to any testing. This note was created with the assistance of voice recognition software. Document was reviewed however may contain grammatical errors.

## 2022-06-13 DIAGNOSIS — E11.9 TYPE 2 DIABETES MELLITUS WITHOUT COMPLICATION, WITH LONG-TERM CURRENT USE OF INSULIN (HCC): Primary | ICD-10-CM

## 2022-06-13 DIAGNOSIS — Z79.4 TYPE 2 DIABETES MELLITUS WITHOUT COMPLICATION, WITH LONG-TERM CURRENT USE OF INSULIN (HCC): Primary | ICD-10-CM

## 2022-06-13 RX ORDER — INSULIN ASPART 100 [IU]/ML
INJECTION, SOLUTION INTRAVENOUS; SUBCUTANEOUS
Qty: 10 PEN | Refills: 3 | Status: SHIPPED
Start: 2022-06-13 | End: 2022-08-30

## 2022-06-13 RX ORDER — INSULIN LISPRO 100 [IU]/ML
INJECTION, SOLUTION INTRAVENOUS; SUBCUTANEOUS
Qty: 10 PEN | Refills: 3 | Status: SHIPPED | OUTPATIENT
Start: 2022-06-13

## 2022-06-14 ENCOUNTER — TELEPHONE (OUTPATIENT)
Dept: ADMINISTRATIVE | Age: 52
End: 2022-06-14

## 2022-06-14 NOTE — TELEPHONE ENCOUNTER
Patient Appointment Form:      PCP: Ada Naqvi  Referring: Ada Naqvi    Has the Patient:    Seen a Cardiologist? no    Had a heart catheterization? no    Had heart surgery? no    Had a stress test or nuclear stress test? no    Had an echocardiogram? no    Had a vascular ultrasound? no    Had a 24/48 heart monitor or extended cardiac event monitor? no    Had recent blood work in the last 6 months? yes    date: recent    ordering physician: Marylee Royals    Had a pacemaker/ICD/ILR implant? no    Seen an Electrophysiologist? no        Will send records via: in [address] [place] [address]      Date & time of appointment:  [email or web address]

## 2022-06-23 ENCOUNTER — OFFICE VISIT (OUTPATIENT)
Dept: CARDIOLOGY CLINIC | Age: 52
End: 2022-06-23
Payer: COMMERCIAL

## 2022-06-23 VITALS
HEART RATE: 100 BPM | WEIGHT: 219.4 LBS | RESPIRATION RATE: 18 BRPM | SYSTOLIC BLOOD PRESSURE: 153 MMHG | BODY MASS INDEX: 34.44 KG/M2 | DIASTOLIC BLOOD PRESSURE: 98 MMHG | HEIGHT: 67 IN

## 2022-06-23 DIAGNOSIS — I10 ESSENTIAL HYPERTENSION: Primary | ICD-10-CM

## 2022-06-23 PROBLEM — E87.5 HYPERKALEMIA: Status: RESOLVED | Noted: 2019-07-01 | Resolved: 2022-06-23

## 2022-06-23 PROCEDURE — 99204 OFFICE O/P NEW MOD 45 MIN: CPT | Performed by: INTERNAL MEDICINE

## 2022-06-23 PROCEDURE — 93000 ELECTROCARDIOGRAM COMPLETE: CPT | Performed by: INTERNAL MEDICINE

## 2022-06-23 NOTE — PROGRESS NOTES
Chief Complaint   Patient presents with    Dizziness       Patient Active Problem List    Diagnosis Date Noted    Vitamin D deficiency 04/09/2021    Dietary noncompliance 10/19/2020    Hypercalcemia 03/12/2020    Type 2 diabetes mellitus with hyperglycemia, with long-term current use of insulin ([facility] [address] 07/01/2019    Mixed hyperlipidemia 07/01/2019    Essential hypertension 07/01/2019    Proteinuria 07/01/2019    Male erectile disorder 07/01/2019       Current Outpatient Medications   Medication Sig Dispense Refill    insulin lispro, 1 Unit Dial, (HUMALOG KWIKPEN) 100 UNIT/ML SOPN 15 units with meals + ss 3:50>150. Max dose 90 units daily 10 pen 3    insulin aspart (NOVOLOG FLEXPEN) 100 UNIT/ML injection pen Inject 15 units with meals + ss 3:50>150. max daily 90 units 10 pen 3    omega-3 acid ethyl esters (LOVAZA) 1 g capsule Take 2 capsules by mouth 2 times daily 120 capsule 1    atorvastatin (LIPITOR) 40 MG tablet Take 1 tablet by mouth daily 90 tablet 3    fenofibric acid (TRILIPIX) 135 MG CPDR capsule Take 1 capsule by mouth daily 90 capsule 3    lisinopril (PRINIVIL;ZESTRIL) 10 MG tablet Take 1 tablet by mouth daily 90 tablet 3    metFORMIN (GLUCOPHAGE) 1000 MG tablet Take 1 tablet by mouth 2 times daily (with meals) 180 tablet 3    sildenafil (REVATIO) 20 MG tablet 1-2 po 1/2-4hrs prior to sexual activity, max one dose per day. 30 tablet 3    Insulin Degludec 200 UNIT/ML SOPN Inject 30 units daily at bedtime 20 pen 1    Insulin Pen Needle (BD PEN NEEDLE LILLY U/F) 32G X 4 MM MISC Uses with insulin 4 times a day 250 each 5    aspirin 81 MG tablet Take 81 mg by mouth daily       No current facility-administered medications for this visit.         No Known Allergies    Vitals:    06/23/22 0837 06/23/22 0844 06/23/22 0846   BP: (!) 152/90 (!) 162/97 (!) 153/98   Site: Right Upper Arm Right Upper Arm Right Upper Arm   Position: Supine Standing Standing   Pulse: 91 (!) 101 100   Resp: 18     Weight: 219 lb 6.4 oz (99.5 kg)     Height: 5' 7\" (1.702 m)         Social History     Socioeconomic History    Marital status:      Spouse name: Not on file    Number of children: Not on file    Years of education: Not on file    Highest education level: Not on file   Occupational History    Not on file   Tobacco Use    Smoking status: Former Smoker     Years: 21.00     Types: Cigarettes     Start date: 2000    Smokeless tobacco: Former User    Tobacco comment: quit    Vaping Use    Vaping Use: Never used   Substance and Sexual Activity    Alcohol use: Yes     Comment: social    Drug use: No    Sexual activity: Yes   Other Topics Concern    Not on file   Social History Narrative    PMH:    Problem List: Essential hypertension, Essential hypertension, Type 2 diabetes mellitus    Health Maintenance:    Influenza Vaccination - (2016)    Medical Problems:    Insulin Dependent Diabetes - DX . Hypertension, Hyperlipidemia    Migraine - neg eval in past    Surgical Hx:    Hernia repair - age 11    T & A, Tympanostomy Tube Insertion            FH:    Father:    . (Hx)    Mother:    . (Hx)    Son 5:    . (Hx)    Grandson 5:    . (Hx)    Dad -  68 cirrhosis, heavy drinker. Mom - thyroid, DM, HTN, colon cancer 64        SH:    . (Marital)Previous [employer] manager; now owns [employer]    moved from [facility]; [address] [place] [address]. 7 moves in 20yrs    Personal Habits: Cigarette Use: Patient smoking status is unknown     Social Determinants of Health     Financial Resource Strain:     Difficulty of Paying Living Expenses: Not on file   Food Insecurity:     Worried About Running Out of Food in the Last Year: Not on file    [place] of Food in the Last Year: Not on file   Transportation Needs:     Lack of Transportation (Medical): Not on file    Lack of Transportation (Non-Medical):  Not on file   Physical Activity:     Days of Exercise per Week: Not on file    Minutes of Exercise per Session: Not on file   Stress:     Feeling of Stress : Not on file   Social Connections:     Frequency of Communication with Friends and Family: Not on file    Frequency of Social Gatherings with Friends and Family: Not on file    Attends Jainism Services: Not on file    Active Member of Clubs or Organizations: Not on file    Attends Club or Organization Meetings: Not on file    Marital Status: Not on file   Intimate Partner Violence:     Fear of Current or Ex-Partner: Not on file    Emotionally Abused: Not on file    Physically Abused: Not on file    Sexually Abused: Not on file   Housing Stability:     Unable to Pay for Housing in the Last Year: Not on file    Number of [place] in the Last Year: Not on file    Unstable Housing in the Last Year: Not on file       Family History   Problem Relation Age of Onset    Thyroid Disease Mother     Diabetes Mother     High Blood Pressure Mother     Arthritis Mother     Cancer Mother     High Cholesterol Mother     Cirrhosis Father     Substance Abuse Father     Diabetes Brother     Diabetes Brother          SUBJECTIVE: Simone Holland presents to the office today for consult - dr Nj Vernon. Hx of DM with poorly regulated BSs, HTN. 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 He is fine with chores at home, or walking  He will find that if he has to prepare a number or meals rapidly and going grom counter to counter ( 5 feet), he gets a little dizzy. He  denies   dyspnea, exertional chest pressure/discomfort, orthopnea, palpitations and syncope. Review of Systems:   Heart: as above   Lungs: as above   Eyes: denies changes in vision or discharge. Ears: denies changes in hearing or pain. Nose: denies epistaxis or masses   Throat: denies sore throat or trouble swallowing. Neuro: denies numbness, tingling, tremors. Skin: denies rashes or itching.    : denies hematuria, dysuria   GI: denies vomiting, diarrhea   Psych: denies mood changed, anxiety, depression. all others negative. Physical Exam   BP (!) 153/98 (Site: Right Upper Arm, Position: Standing)   Pulse 100   Resp 18   Ht 5' 7\" (1.702 m)   Wt 219 lb 6.4 oz (99.5 kg)   BMI 34.36 kg/m²   Constitutional: Oriented to person, place, and time. Well-developed and well-nourished. No distress. Head: Normocephalic and atraumatic. Eyes: EOM are normal. Pupils are equal, round, and reactive to light. Neck: Normal range of motion. Neck supple. No hepatojugular reflux and no JVD present. Carotid bruit is not present. Cardiovascular: Normal rate, regular rhythm, normal heart sounds and intact distal pulses. Exam reveals no gallop and no friction rub. No murmur heard. Pulmonary/Chest: Effort normal and breath sounds normal. No respiratory distress. No wheezes. No rales. Abdominal: Soft. Bowel sounds are normal. No distension and no mass. No tenderness. No rebound and no guarding. Musculoskeletal: Normal range of motion. No edema and no tenderness. Neurological: Alert and oriented to person, place, and time. Skin: Skin is warm and dry. No rash noted. Not diaphoretic. No erythema. Psychiatric: Normal mood and affect. Behavior is normal.     EKG:  normal EKG, normal sinus rhythm.     ASSESSMENT AND PLAN:  Patient Active Problem List   Diagnosis    Type 2 diabetes mellitus with hyperglycemia, with long-term current use of insulin ([address]    Mixed hyperlipidemia    Essential hypertension    Proteinuria    Male erectile disorder    Hypercalcemia    Dietary noncompliance    Vitamin D deficiency     Patient with normal functional capacity, CV exam and ekg and 2017 echo  Orthostatic vitals in office today no evidence for POTS or orthostatis  Symptoms likely related to relative volume depletion due to metabolic issues and caffeine diuresis  Recommendations made  No testing indicated  I note appropriate high intensity statin and ACE therapy in this diabetic patient    MARITZA Mccray M.D  [facility]

## 2022-07-29 RX ORDER — PEN NEEDLE, DIABETIC 32GX 5/32"
NEEDLE, DISPOSABLE MISCELLANEOUS
Qty: 250 EACH | Refills: 0 | Status: SHIPPED | OUTPATIENT
Start: 2022-07-29

## 2022-08-01 DIAGNOSIS — E78.2 MIXED HYPERLIPIDEMIA: ICD-10-CM

## 2022-08-01 DIAGNOSIS — E87.5 HYPERKALEMIA: ICD-10-CM

## 2022-08-01 LAB
ALBUMIN SERPL-MCNC: 4.4 G/DL (ref 3.5–5.2)
ALP BLD-CCNC: 90 U/L (ref 40–129)
ALT SERPL-CCNC: 32 U/L (ref 0–40)
ANION GAP SERPL CALCULATED.3IONS-SCNC: 12 MMOL/L (ref 7–16)
AST SERPL-CCNC: 23 U/L (ref 0–39)
BILIRUB SERPL-MCNC: 0.3 MG/DL (ref 0–1.2)
BUN BLDV-MCNC: 17 MG/DL (ref 6–20)
CALCIUM SERPL-MCNC: 9.7 MG/DL (ref 8.6–10.2)
CHLORIDE BLD-SCNC: 101 MMOL/L (ref 98–107)
CHOLESTEROL, TOTAL: 153 MG/DL (ref 0–199)
CO2: 24 MMOL/L (ref 22–29)
CREAT SERPL-MCNC: 0.7 MG/DL (ref 0.7–1.2)
GFR AFRICAN AMERICAN: >60
GFR NON-AFRICAN AMERICAN: >60 ML/MIN/1.73
GLUCOSE BLD-MCNC: 300 MG/DL (ref 74–99)
HDLC SERPL-MCNC: 31 MG/DL
LDL CHOLESTEROL CALCULATED: 57 MG/DL (ref 0–99)
POTASSIUM SERPL-SCNC: 5 MMOL/L (ref 3.5–5)
SODIUM BLD-SCNC: 137 MMOL/L (ref 132–146)
TOTAL CK: 148 U/L (ref 20–200)
TOTAL PROTEIN: 7.5 G/DL (ref 6.4–8.3)
TRIGL SERPL-MCNC: 323 MG/DL (ref 0–149)
VLDLC SERPL CALC-MCNC: 65 MG/DL

## 2022-08-04 ENCOUNTER — OFFICE VISIT (OUTPATIENT)
Dept: PRIMARY CARE CLINIC | Age: 52
End: 2022-08-04
Payer: COMMERCIAL

## 2022-08-04 VITALS
TEMPERATURE: 97.4 F | BODY MASS INDEX: 34.61 KG/M2 | OXYGEN SATURATION: 97 % | SYSTOLIC BLOOD PRESSURE: 134 MMHG | WEIGHT: 221 LBS | HEART RATE: 91 BPM | DIASTOLIC BLOOD PRESSURE: 80 MMHG

## 2022-08-04 DIAGNOSIS — E11.65 TYPE 2 DIABETES MELLITUS WITH HYPERGLYCEMIA, WITH LONG-TERM CURRENT USE OF INSULIN (HCC): ICD-10-CM

## 2022-08-04 DIAGNOSIS — Z79.4 TYPE 2 DIABETES MELLITUS WITH HYPERGLYCEMIA, WITH LONG-TERM CURRENT USE OF INSULIN (HCC): ICD-10-CM

## 2022-08-04 DIAGNOSIS — R80.9 PROTEINURIA, UNSPECIFIED TYPE: ICD-10-CM

## 2022-08-04 DIAGNOSIS — I10 ESSENTIAL HYPERTENSION: ICD-10-CM

## 2022-08-04 DIAGNOSIS — E78.2 MIXED HYPERLIPIDEMIA: Primary | ICD-10-CM

## 2022-08-04 DIAGNOSIS — N52.9 MALE ERECTILE DISORDER: ICD-10-CM

## 2022-08-04 PROCEDURE — 99214 OFFICE O/P EST MOD 30 MIN: CPT | Performed by: FAMILY MEDICINE

## 2022-08-04 PROCEDURE — 3046F HEMOGLOBIN A1C LEVEL >9.0%: CPT | Performed by: FAMILY MEDICINE

## 2022-08-04 SDOH — ECONOMIC STABILITY: FOOD INSECURITY: WITHIN THE PAST 12 MONTHS, THE FOOD YOU BOUGHT JUST DIDN'T LAST AND YOU DIDN'T HAVE MONEY TO GET MORE.: NEVER TRUE

## 2022-08-04 SDOH — ECONOMIC STABILITY: FOOD INSECURITY: WITHIN THE PAST 12 MONTHS, YOU WORRIED THAT YOUR FOOD WOULD RUN OUT BEFORE YOU GOT MONEY TO BUY MORE.: NEVER TRUE

## 2022-08-04 ASSESSMENT — PATIENT HEALTH QUESTIONNAIRE - PHQ9
2. FEELING DOWN, DEPRESSED OR HOPELESS: 0
SUM OF ALL RESPONSES TO PHQ QUESTIONS 1-9: 0
SUM OF ALL RESPONSES TO PHQ QUESTIONS 1-9: 0
1. LITTLE INTEREST OR PLEASURE IN DOING THINGS: 0
SUM OF ALL RESPONSES TO PHQ QUESTIONS 1-9: 0
SUM OF ALL RESPONSES TO PHQ9 QUESTIONS 1 & 2: 0
SUM OF ALL RESPONSES TO PHQ QUESTIONS 1-9: 0

## 2022-08-04 ASSESSMENT — SOCIAL DETERMINANTS OF HEALTH (SDOH): HOW HARD IS IT FOR YOU TO PAY FOR THE VERY BASICS LIKE FOOD, HOUSING, MEDICAL CARE, AND HEATING?: NOT HARD AT ALL

## 2022-08-04 NOTE — PROGRESS NOTES
Ivonne Whitfield : 1970 Sex: male  Age: 50 y.o. Chief Complaint   Patient presents with    Annual Exam    Health Maintenance    Discuss Labs    Other     Saw cardiology          HPI:      Patient presents today for follow-up last seen virtually 2021, last seen in person 3/12/2020. Generally feels well. He has endocrinologist but overdue on follow-up. Imperative he contact them and follow-up ASAP. He states he was unable to schedule online but he will call. Has been out of his sliding scale because of cost, he will contact them to find equivalent. Using his [place]. Sugars are extremely uncontrolled. Risk of this both macrovascular and microvascular reviewed and he understands. Ran out of all of his medicines a few days or week prior to labs. 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 He wonders if it is his lisinopril. He does not have his blood pressure cuff handy, recommended he bring it to check his blood pressure to check for hypotension, discussed cutting lisinopril in half. Discussed anginal equivalent symptoms. EKG done and reviewed with him. He is willing to see cardiologist on a nonemergent outpatient basis. Defers other holloway at this time    We again reiterated my concern with his uncontrolled diabetes and understands the risk of sudden DF events. Blood work reviewed, CMP shows chloride 96 CO2 21 glucose 359 otherwise normal, triglyceride extremely elevated 856 HDL 31,  LDL was 84 hemoglobin A1c went from 14.6-12.3 TSH 2.6 vitamin D 31 CBC with differential normal urinalysis shows greater than 1000 glucose, 30 protein with 265 microalbumin creatinine ratio.   Saw nephrologist in the past, felt it was a waste    Most Recent Labs  CBC  Lab Results   Component Value Date/Time    WBC 4.9 2022 07:16 AM    WBC 5.0 01/11/2022 08:32 AM    WBC 5.2 04/07/2021 07:38 AM    RBC 4.81 06/08/2022 07:16 AM    RBC 5.11 01/11/2022 08:32 AM    RBC 4.96 04/07/2021 07:38 AM    HGB 14.0 06/08/2022 07:16 AM    HGB 15.0 01/11/2022 08:32 AM    HGB 14.2 04/07/2021 07:38 AM    HCT 42.5 06/08/2022 07:16 AM    HCT 46.1 01/11/2022 08:32 AM    HCT 43.5 04/07/2021 07:38 AM    MCV 88.4 06/08/2022 07:16 AM    MCV 90.2 01/11/2022 08:32 AM    MCV 87.7 04/07/2021 07:38 AM     06/08/2022 07:16 AM     01/11/2022 08:32 AM     04/07/2021 07:38 AM      CMP  Lab Results   Component Value Date/Time     08/01/2022 08:39 AM     06/08/2022 07:16 AM     01/11/2022 08:32 AM    K 5.0 08/01/2022 08:39 AM    K 4.9 06/08/2022 07:16 AM    K 5.6 01/11/2022 08:32 AM     08/01/2022 08:39 AM    CL 96 06/08/2022 07:16 AM    CL 99 01/11/2022 08:32 AM    CO2 24 08/01/2022 08:39 AM    CO2 21 06/08/2022 07:16 AM    CO2 24 01/11/2022 08:32 AM    ANIONGAP 12 08/01/2022 08:39 AM    ANIONGAP 16 06/08/2022 07:16 AM    ANIONGAP 14 01/11/2022 08:32 AM    GLUCOSE 300 08/01/2022 08:39 AM    GLUCOSE 359 06/08/2022 07:16 AM    GLUCOSE 376 01/11/2022 08:32 AM    BUN 17 08/01/2022 08:39 AM    BUN 14 06/08/2022 07:16 AM    BUN 16 01/11/2022 08:32 AM    CREATININE 0.7 08/01/2022 08:39 AM    CREATININE 0.7 06/08/2022 07:16 AM    CREATININE 0.8 01/11/2022 08:32 AM    LABGLOM >60 08/01/2022 08:39 AM    LABGLOM >60 06/08/2022 07:16 AM    LABGLOM >60 01/11/2022 08:32 AM    GFRAA >60 08/01/2022 08:39 AM    GFRAA >60 06/08/2022 07:16 AM    GFRAA >60 01/11/2022 08:32 AM    CALCIUM 9.7 08/01/2022 08:39 AM    CALCIUM 9.6 06/08/2022 07:16 AM    CALCIUM 10.0 01/11/2022 08:32 AM    PROT 7.5 08/01/2022 08:39 AM    PROT 7.3 06/08/2022 07:16 AM    PROT 7.6 01/11/2022 08:32 AM    LABALBU 4.4 08/01/2022 08:39 AM    LABALBU 4.4 06/08/2022 07:16 AM    LABALBU 4.3 01/11/2022 08:32 AM    BILITOT 0.3 08/01/2022 08:39 AM    BILITOT <0.2 06/08/2022 07:16 AM    BILITOT 0.2 01/11/2022 08:32 AM    ALKPHOS 90 08/01/2022 08:39 AM    ALKPHOS 127 06/08/2022 07:16 AM    ALKPHOS 139 01/11/2022 08:32 AM    AST 23 08/01/2022 08:39 AM    AST 32 06/08/2022 07:16 AM    AST 20 01/11/2022 08:32 AM    ALT 32 08/01/2022 08:39 AM    ALT 38 06/08/2022 07:16 AM    ALT 27 01/11/2022 08:32 AM     A1C  Lab Results   Component Value Date/Time    LABA1C 12.3 06/08/2022 07:16 AM    LABA1C 14.6 01/11/2022 08:32 AM    LABA1C 10.0 04/07/2021 07:38 AM     TSH  Lab Results   Component Value Date/Time    TSH 2.640 06/08/2022 07:16 AM    TSH 2.570 01/11/2022 08:32 AM    TSH 1.830 04/07/2021 07:38 AM     FREET4  No results found for: L0EYHER  LIPID  Lab Results   Component Value Date/Time    CHOL 153 08/01/2022 08:39 AM    CHOL 250 06/08/2022 07:16 AM    CHOL 172 01/11/2022 08:32 AM    HDL 31 08/01/2022 08:39 AM    HDL 31 06/08/2022 07:16 AM    HDL 35 01/11/2022 08:32 AM    LDLCALC 57 08/01/2022 08:39 AM    LDLCALC - 06/08/2022 07:16 AM    LDLCALC 84 01/11/2022 08:32 AM    TRIG 323 08/01/2022 08:39 AM    TRIG 856 06/08/2022 07:16 AM    TRIG 263 01/11/2022 08:32 AM     VITAMIN D  Lab Results   Component Value Date/Time    VITD25 31 06/08/2022 07:16 AM    VITD25 25 01/11/2022 08:32 AM     MAGNESIUM  No results found for: MG   PHOS  No results found for: PHOS   BERNIE   No results found for: BERNIE  RHEUMATOID FACTOR  No results found for: RF  PSA  Lab Results   Component Value Date/Time    PSA 0.59 01/11/2022 08:32 AM      HEPATITIS C  No results found for: HCVABI  HIV  No results found for: UIZ0YPS, HIV1QT  UA  Lab Results   Component Value Date/Time    COLORU Yellow 06/08/2022 07:16 AM    COLORU Yellow 01/11/2022 08:33 AM    COLORU Yellow 04/07/2021 07:39 AM    CLARITYU Clear 06/08/2022 07:16 AM    CLARITYU Clear 01/11/2022 08:33 AM    CLARITYU Clear 04/07/2021 07:39 AM    GLUCOSEU >=1000 06/08/2022 07:16 AM    GLUCOSEU >=1000 01/11/2022 08:33 AM    GLUCOSEU >=1000 04/07/2021 07:39 AM    BILIRUBINUR Negative 06/08/2022 07:16 AM    BILIRUBINUR Negative 01/11/2022 08:33 AM    BILIRUBINUR Negative 04/07/2021 07:39 AM    KETUA Negative 06/08/2022 07:16 AM    KETUA Negative 01/11/2022 08:33 AM    KETUA Negative 04/07/2021 07:39 AM    SPECGRAV 1.015 06/08/2022 07:16 AM    SPECGRAV 1.015 01/11/2022 08:33 AM    SPECGRAV 1.020 04/07/2021 07:39 AM    BLOODU Negative 06/08/2022 07:16 AM    BLOODU Negative 01/11/2022 08:33 AM    BLOODU Negative 04/07/2021 07:39 AM    PHUR 6.0 06/08/2022 07:16 AM    PHUR 6.0 01/11/2022 08:33 AM    PHUR 6.5 04/07/2021 07:39 AM    PROTEINU 30 06/08/2022 07:16 AM    PROTEINU TRACE 01/11/2022 08:33 AM    PROTEINU 30 04/07/2021 07:39 AM    UROBILINOGEN 0.2 06/08/2022 07:16 AM    UROBILINOGEN 0.2 01/11/2022 08:33 AM    UROBILINOGEN 1.0 04/07/2021 07:39 AM    NITRU Negative 06/08/2022 07:16 AM    NITRU Negative 01/11/2022 08:33 AM    NITRU Negative 04/07/2021 07:39 AM    LEUKOCYTESUR Negative 06/08/2022 07:16 AM    LEUKOCYTESUR Negative 01/11/2022 08:33 AM    LEUKOCYTESUR Negative 04/07/2021 07:39 AM     Urine Micro/Albumin Ratio  Lab Results   Component Value Date/Time    MALBCR 265.6 06/08/2022 07:16 AM    MALBCR 208.2 04/07/2021 07:39 AM    MALBCR 123.8 02/22/2020 08:29 AM         Review of Systems  ROS:  Const: Denies chills, fever, malaise and sweats. Eyes: Denies discharge, pain, redness and visual disturbance. ENMT: Denies earaches, other ear symptoms. Denies nasal or sinus symptoms other than stated  above. Denies mouth and tongue lesions and sore throat. CV: Denies chest discomfort, pain; diaphoresis, dizziness, edema, lightheadedness, orthopnea,  palpitations, syncope and near syncopal episode or any exertional symptoms  Resp: Denies cough, hemoptysis, pleuritic pain, SOB, sputum production and wheezing. GI: Denies abdominal pain, change in bowel habits, hematochezia, melena, nausea and vomiting. : Denies urinary symptoms including dysuria , urgency, frequency or hematuria.   Musculo: Denies musculoskeletal symptoms. Skin: Denies bruising and rash. Neuro: Denies headache, numbness, stiff neck, tingling and focal weakness slurred speech or facial  droop  Hema/Lymph: Denies bleeding/bruising tendency and enlarged lymph nodes        Current Outpatient Medications:     Insulin Pen Needle (BD PEN NEEDLE LILLY U/F) 32G X 4 MM MISC, Uses with insulin 4 times a day, Disp: 250 each, Rfl: 0    insulin lispro, 1 Unit Dial, (HUMALOG KWIKPEN) 100 UNIT/ML SOPN, 15 units with meals + ss 3:50>150. Max dose 90 units daily, Disp: 10 pen, Rfl: 3    insulin aspart (NOVOLOG FLEXPEN) 100 UNIT/ML injection pen, Inject 15 units with meals + ss 3:50>150.  max daily 90 units, Disp: 10 pen, Rfl: 3    omega-3 acid ethyl esters (LOVAZA) 1 g capsule, Take 2 capsules by mouth 2 times daily, Disp: 120 capsule, Rfl: 1    atorvastatin (LIPITOR) 40 MG tablet, Take 1 tablet by mouth daily, Disp: 90 tablet, Rfl: 3    fenofibric acid (TRILIPIX) 135 MG CPDR capsule, Take 1 capsule by mouth daily, Disp: 90 capsule, Rfl: 3    lisinopril (PRINIVIL;ZESTRIL) 10 MG tablet, Take 1 tablet by mouth daily, Disp: 90 tablet, Rfl: 3    metFORMIN (GLUCOPHAGE) 1000 MG tablet, Take 1 tablet by mouth 2 times daily (with meals), Disp: 180 tablet, Rfl: 3    sildenafil (REVATIO) 20 MG tablet, 1-2 po 1/2-4hrs prior to sexual activity, max one dose per day., Disp: 30 tablet, Rfl: 3    Insulin Degludec 200 UNIT/ML SOPN, Inject 30 units daily at bedtime, Disp: 20 pen, Rfl: 1    aspirin 81 MG tablet, Take 81 mg by mouth daily, Disp: , Rfl:   No Known Allergies    Past Medical History:   Diagnosis Date    Encounter for screening colonoscopy 4/22/2019    Erectile dysfunction     Headache     migraine    Hyperlipidemia     Hypertension     Isolated proteinuria     Type 2 diabetes mellitus without complication (HCC)     insulin dependent    Type II or unspecified type diabetes mellitus without mention of complication, not stated as uncontrolled      Past Surgical History:   Procedure Laterality Date    ADENOIDECTOMY AND TYMPANOSTOMY TUBE PLACEMENT      COLONOSCOPY N/A 2019    COLONOSCOPY WITH BIOPSY performed by Jaye Knowles MD at [facility]      age 11 bilateral inguinal    TONSILLECTOMY AND ADENOIDECTOMY      VASECTOMY       Family History   Problem Relation Age of Onset    Thyroid Disease Mother     Diabetes Mother     High Blood Pressure Mother     Arthritis Mother     Cancer Mother     High Cholesterol Mother     Cirrhosis Father     Substance Abuse Father     Diabetes Brother     Diabetes Brother      Social History     Tobacco Use    Smoking status: Former     Packs/day: 1.00     Years: 21.00     Pack years: 21.00     Types: Cigarettes     Start date: 2000     Quit date:      Years since quittin.5    Smokeless tobacco: Former    Tobacco comments:     quit    Vaping Use    Vaping Use: Never used   Substance Use Topics    Alcohol use: Yes     Comment: social    Drug use: No      Social History     Social History Narrative    PMH:    Problem List: Essential hypertension, Essential hypertension, Type 2 diabetes mellitus    Health Maintenance:    Influenza Vaccination - (2016)    Medical Problems:    Insulin Dependent Diabetes - DX . Hypertension, Hyperlipidemia    Migraine - neg eval in past    Surgical Hx:    Hernia repair - age 11    T & A, Tympanostomy Tube Insertion            FH:    Father:    . (Hx)    Mother:    . (Hx)    Son 5:    . (Hx)    Grandson 5:    . (Hx)    Dad -  68 cirrhosis, heavy drinker. Mom - thyroid, DM, HTN, colon cancer 64        SH:    . 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  7 moves in 20yrs    Personal Habits: Cigarette Use: Patient smoking status is unknown        Vitals:    22 0815   BP: 134/80   Pulse: 91   Temp: 97.4 °F (36.3 °C)   SpO2: 97%   Weight: 221 lb (100.2 kg)      Wt Readings from Last 3 Encounters:   08/04/22 221 lb (100.2 kg)   06/23/22 219 lb 6.4 oz (99.5 kg)   06/09/22 216 lb (98 kg)        Physical Exam  Exam:  Const: Appears comfortable. No signs of acute distress present. Head/Face: Atraumatic on inspection. Eyes: EOMI in both eyes. No discharge from the eyes. PERRL. Sclerae clear. ENMT: Auditory canals normal. Tympanic membranes: intact and translucent. External nose WNL. Nasal mucosa is clear. Oropharynx: No erythema or exudate. Posterior pharynx is normal.  Neck: Supple. Palpation reveals no adenopathy. No masses appreciated. No JVD. Carotids: no  bruits. Resp: Respirations are unlabored. Clear to auscultation. No rales, rhonchi or wheezes appreciated  over the lungs bilaterally. CV: Rate is regular. Rhythm is regular. No gallop or rubs. No heart murmur appreciated. Extremities: No clubbing, cyanosis, or edema. No calf inflammation or tenderness. Abdomen: Bowel sounds are normoactive. Abdomen is soft, nontender, and nondistended. No  abdominal masses. No palpable hepatosplenomegaly. Lymph: No palpable or visible regional lymphadenopathy. Musculoskeletal: no acute joint inflammation. Skin: Dry and warm with no rash. Skin normal to inspection and palpation overall. Neuro: Alert and oriented. Affect: appropriate. Upper Extremities: 5/5 bilaterally. Lower Extremities:  5/5 bilaterally. Sensation intact to light touch. Reflexes: DTR's are symmetric and 2+ bilaterally. .  Cranial Nerves: Cranial nerves grossly intact. Assessment and Plan:  {No diagnosis found. (Refresh or delete this SmartLink)}        Dizziness  Counseled extensively. Differential reviewed, including serious etiologies. Differential vast especially with uncontrolled diabetes, cannot rule out anginal equivalent. EKG done reviewed with him. Risk-benefit Ione reviewed.   Instead of taking baby aspirin has been taking Excedrin in the morning, counseled against NSAIDs but again counseled on pros and cons of baby aspirin both preventative and therapeutic doses. Willing to see cardiologist on a nonurgent outpatient basis for stress test.  Not interested in carotid ultrasound or other holloway now. He should monitor his blood pressure. He is going to consider reducing lisinopril dose if in fact hypotensive. Also sugar control emphasized. Vitamin D deficiency  Currently normal at 32, continue current supplementation        Hypercalcemia  Counseled extensively. Differential reviewed, including serious etiologies. Previously admitted to 2000 spencer a day of whole milk. Counseled. Repeat normalized. Proteinuria  Microalbumin to creatinine ratio was 188 on 7/19 - 123 2/20. His microalbumin to creatinine ratio was 26 on 2/19, 24 on 2/18, 132 on 12/16 and 32 on 9/16, now 208 4/21 and 265 on 6/22. .  Increase lisinopril from 5-10. BMP 5 to 7 days. Sugar control emphasized. Avoid nephrotoxic agents, including NSAIDs and excessive protein. Proper hydration. Saw [facility] nephrology in the past.  States it was a waste of time and declines follow-up. Proper hydration reviewed. Avoid nephrotoxic agents. Sugar control emphasized. On ACE inhibitor although considering reducing if becoming hypotensive. Monitor. Type 2 diabetes mellitus with hyperglycemia, with long-term current use of insulin ([facility])  counseled. Uncontrolled but has been out of his i sliding scale nsulin for a while, metformin recently. All meds called in today except for insulin, he should be following with Dr. Abiodun Solis, he will contact them today, let them know that his sliding scales not affordable so they can change. .  Currently on Tresiba, and supposed to be on sliding scale plus Metformin. Watch BS ambulatory. Parameters given. Call if not in range. ER if  dangerous numbers. micro-and macrovascular complications reviewed, hyper  hypoglycemic precautions reviewed. Importance of at least daily foot exams and  yearly eye exams reviewed.    Risk of dka reviewed. concerning numbers. Risks of severe diabetic complications reviewed including debilitating/fatal events. Cautions regarding Metformin reviewed including lactic acidosis, proper hydration reviewed. Mixed hyperlipidemia  Very high triglycerides today, LDL not calculated. Lifestyle modification reviewed. risk of hyperlipidemia reviewed, including cardiovascular, cerebrovascular, peripheral vascular as well as pancreatitis. Other treatment  options reviewed. Consider switching fenofibrate to gemfibrozil. Start Lovaza with standard precautions. Previously was on Vascepa, but unaffordable. Contrasted prescription fish oils with over-the-counter. Lifestyle modification emphasized. Sugar control emphasized. Blood work 1 month follow-up then sooner as needed    Essential hypertension  Stable in the office. Counseled. He thinks he may be getting somewhat hypotensive during very busy days. He is thinking about lowering his lisinopril to 5 mg daily. Counseled. Refill 10 mg for now. The risks of hypertension and hypotension reviewed. Watch closely ambulatory. Hyper and hypotensive precautions and parameters reviewed and written as well as parameters on pulse, call if out of range, ER dangers numbers. Lifestyle modification reviewed. standard precautions of lisinopril reviewed including RI, electrolyte imbalance, angioedema, cough. Health maintenance examination  Counseled at length 3/18, encouraged yearly physicals     Male erectile disorder  Counseled extensively. Differential reviewed, including serious etiologies. Treatment  options reviewed. Declines further evaluation. Risks and benefits of Viagra  reviewed and has  with precautions including absolute contra indication with nitrates  and alpha blockers. Counseled on orthostatic precautions. Getting good results with this, refilled           Hyperkalemia  Counseled extensively.  Differential reviewed, including serious etiologies. Although there have been multiple false positives at the lab recently, the concerns with a truly positive result reviewed. He is asymptomatic. Low potassium intake reviewed. Proper hydration. Discussed adjusting  ACE inhibitor. Repeat normalized. Counseled extensively and differential diagnoses of above were reviewed, including serious etiologies. Side effects and interactions of medications were reviewed. No flowsheet data found. Plan as above. Counseled extensively and differential diagnoses relevant to above were reviewed, including serious etiologies, risks and complications, especially of left uncontrolled. If relevant, instructions and  alternatives to meds/treatment reviewed, as well as interactions, and  SE's/ADRs reviewed, notify immediately if any, discontinuing new meds if any. Plan made after discussion and shared decision making. In summary, I do have significant concerns with his uncontrolled diabetes, uncontrolled hyperlipidemia and proteinuria. Lifestyle modification emphasized. He will contact endocrinologist when we are finished his he has been out of sliding scale insulin because it is unaffordable, he will discuss with them switching to a formulary equivalent. Follow-up with them ASAP. Refer to cardiology. EKG done and reviewed. Start Lovaza with precautions. Declines nephrology. Healthy diet, exercise, proper hydration, avoid nephrotoxic agents such as NSAIDs. Blood work and follow-up 4 to 5 weeks sooner as needed. Plan physical then. Refilled other meds. Defers other holloway            As long as symptoms steadily improve/resolve, and medical conditions follow the expected course, FU as below, sooner PRN. No follow-ups on file. Educational materials and/or home exercises printed for patient's review and were included in patient instructions on his/her After Visit Summary and given to patient at the end of visit.        After discussion, patient and/or guardian verbalizes understanding, agrees, feels comfortable with and wishes to proceed with above treatment plan. Call for any pending results, FU sooner if abnormal, as needed or if any current symptoms persist/worsen. Advised patient to call with any new medication issues, and read all Rx info from pharmacy to assure aware of all possible risks and side effects of medication before taking. Reviewed age and gender appropriate health screening exams and vaccinations. Advised patient regarding importance of keeping up with recommended health maintenance and to schedule as soon as possible if overdue, as this is important in assessing for undiagnosed pathology, especially cancer, as well as protecting against potentially harmful/life threatening disease. Patient and/or guardian verbalizes understanding and agrees with above counseling, assessment and plan. All questions answered. Signs and symptoms to watch for discussed, serious signs and symptoms reviewed. ER if any. Over 44 minutes  spent with the patient in reviewing records, reviewing with patient/family, counseling, ordering,  prescribing, completing h&p, etc., with over 50% of the time spent face to face counseling. Salomón Goddard MD    Patients are advised to check with insurance company to ensure coverage and to fully understand benefits and cost prior to any testing. This note was created with the assistance of voice recognition software. Document was reviewed however may contain grammatical errors.

## 2022-08-04 NOTE — PROGRESS NOTES
Melissa Mayo : 1970 Sex: male  Age: 50 y.o. Chief Complaint   Patient presents with    Annual Exam    Health Maintenance    Discuss Labs    Other     Saw cardiology        HPI  HPI:      Patient presents today for follow-up. Since seeing me he saw ophthalmologist who is monitoring the size of his blood vessels, possible early glaucoma versus normal variant he states. Saw cardiologist who recommended some lifestyle modification and his symptoms resolved. He has changed his insulin somewhat. Does forget to take his sliding scale throughout the day. Sugars have improved. He is meeting regularly with endocrinologist and working with this. Tolerating other meds. Generally feels well. Interested in [address] being checked. Risk benefits of supplementation reviewed. Risk factors reviewed but no symptoms at this time.     Blood work reviewed, glucose 300 triglycerides came down to 323 and this was nonfasting HDL 31 LDL 57 CMP normal      Most Recent Labs  CBC  Lab Results   Component Value Date/Time    WBC 4.9 2022 07:16 AM    WBC 5.0 2022 08:32 AM    WBC 5.2 2021 07:38 AM    RBC 4.81 2022 07:16 AM    RBC 5.11 2022 08:32 AM    RBC 4.96 2021 07:38 AM    HGB 14.0 2022 07:16 AM    HGB 15.0 2022 08:32 AM    HGB 14.2 2021 07:38 AM    HCT 42.5 2022 07:16 AM    HCT 46.1 2022 08:32 AM    HCT 43.5 2021 07:38 AM    MCV 88.4 2022 07:16 AM    MCV 90.2 2022 08:32 AM    MCV 87.7 2021 07:38 AM     2022 07:16 AM     2022 08:32 AM     2021 07:38 AM      CMP  Lab Results   Component Value Date/Time     2022 08:39 AM     2022 07:16 AM     2022 08:32 AM    K 5.0 2022 08:39 AM    K 4.9 2022 07:16 AM    K 5.6 2022 08:32 AM     2022 08:39 AM    CL 96 2022 07:16 AM    CL 99 2022 08:32 AM    CO2 24 35 01/11/2022 08:32 AM    LDLCALC 57 08/01/2022 08:39 AM    LDLCALC - 06/08/2022 07:16 AM    LDLCALC 84 01/11/2022 08:32 AM    TRIG 323 08/01/2022 08:39 AM    TRIG 856 06/08/2022 07:16 AM    TRIG 263 01/11/2022 08:32 AM     VITAMIN D  Lab Results   Component Value Date/Time    VITD25 31 06/08/2022 07:16 AM    VITD25 25 01/11/2022 08:32 AM     MAGNESIUM  No results found for: MG   PHOS  No results found for: PHOS   BERNIE   No results found for: BERNIE  RHEUMATOID FACTOR  No results found for: RF  PSA  Lab Results   Component Value Date/Time    PSA 0.59 01/11/2022 08:32 AM      HEPATITIS C  No results found for: HCVABI  HIV  No results found for: YVX0OLS, HIV1QT  UA  Lab Results   Component Value Date/Time    COLORU Yellow 06/08/2022 07:16 AM    COLORU Yellow 01/11/2022 08:33 AM    COLORU Yellow 04/07/2021 07:39 AM    CLARITYU Clear 06/08/2022 07:16 AM    CLARITYU Clear 01/11/2022 08:33 AM    CLARITYU Clear 04/07/2021 07:39 AM    GLUCOSEU >=1000 06/08/2022 07:16 AM    GLUCOSEU >=1000 01/11/2022 08:33 AM    GLUCOSEU >=1000 04/07/2021 07:39 AM    BILIRUBINUR Negative 06/08/2022 07:16 AM    BILIRUBINUR Negative 01/11/2022 08:33 AM    BILIRUBINUR Negative 04/07/2021 07:39 AM    KETUA Negative 06/08/2022 07:16 AM    KETUA Negative 01/11/2022 08:33 AM    KETUA Negative 04/07/2021 07:39 AM    SPECGRAV 1.015 06/08/2022 07:16 AM    SPECGRAV 1.015 01/11/2022 08:33 AM    SPECGRAV 1.020 04/07/2021 07:39 AM    BLOODU Negative 06/08/2022 07:16 AM    BLOODU Negative 01/11/2022 08:33 AM    BLOODU Negative 04/07/2021 07:39 AM    PHUR 6.0 06/08/2022 07:16 AM    PHUR 6.0 01/11/2022 08:33 AM    PHUR 6.5 04/07/2021 07:39 AM    PROTEINU 30 06/08/2022 07:16 AM    PROTEINU TRACE 01/11/2022 08:33 AM    PROTEINU 30 04/07/2021 07:39 AM    UROBILINOGEN 0.2 06/08/2022 07:16 AM    UROBILINOGEN 0.2 01/11/2022 08:33 AM    UROBILINOGEN 1.0 04/07/2021 07:39 AM    NITRU Negative 06/08/2022 07:16 AM    NITRU Negative 01/11/2022 08:33 AM    NITRU Negative 04/07/2021 07:39 AM    LEUKOCYTESUR Negative 06/08/2022 07:16 AM    LEUKOCYTESUR Negative 01/11/2022 08:33 AM    LEUKOCYTESUR Negative 04/07/2021 07:39 AM     Urine Micro/Albumin Ratio  Lab Results   Component Value Date/Time    ROSBCR 265.6 06/08/2022 07:16 AM    MALBCR 208.2 04/07/2021 07:39 AM    MALBCR 123.8 02/22/2020 08:29 AM         Review of Systems  ROS:  Const: Denies chills, fever, malaise and sweats. Eyes: Denies discharge, pain, redness and visual disturbance. ENMT: Denies earaches, other ear symptoms. Denies nasal or sinus symptoms other than stated  above. Denies mouth and tongue lesions and sore throat. CV: Denies chest discomfort, pain; diaphoresis, dizziness, edema, lightheadedness, orthopnea,  palpitations, syncope and near syncopal episode or any exertional symptoms  Resp: Denies cough, hemoptysis, pleuritic pain, SOB, sputum production and wheezing. GI: Denies abdominal pain, change in bowel habits, hematochezia, melena, nausea and vomiting. : Denies urinary symptoms including dysuria , urgency, frequency or hematuria. Musculo: Denies musculoskeletal symptoms. Skin: Denies bruising and rash. Neuro: Denies headache, numbness, stiff neck, tingling and focal weakness slurred speech or facial  droop  Hema/Lymph: Denies bleeding/bruising tendency and enlarged lymph nodes        Current Outpatient Medications:     Insulin Pen Needle (BD PEN NEEDLE LILLY U/F) 32G X 4 MM MISC, Uses with insulin 4 times a day, Disp: 250 each, Rfl: 0    insulin lispro, 1 Unit Dial, (HUMALOG KWIKPEN) 100 UNIT/ML SOPN, 15 units with meals + ss 3:50>150. Max dose 90 units daily, Disp: 10 pen, Rfl: 3    insulin aspart (NOVOLOG FLEXPEN) 100 UNIT/ML injection pen, Inject 15 units with meals + ss 3:50>150.  max daily 90 units, Disp: 10 pen, Rfl: 3    omega-3 acid ethyl esters (LOVAZA) 1 g capsule, Take 2 capsules by mouth 2 times daily, Disp: 120 capsule, Rfl: 1    atorvastatin (LIPITOR) 40 MG tablet, Take 1 tablet by mouth daily, Disp: 90 tablet, Rfl: 3    fenofibric acid (TRILIPIX) 135 MG CPDR capsule, Take 1 capsule by mouth daily, Disp: 90 capsule, Rfl: 3    lisinopril (PRINIVIL;ZESTRIL) 10 MG tablet, Take 1 tablet by mouth daily, Disp: 90 tablet, Rfl: 3    metFORMIN (GLUCOPHAGE) 1000 MG tablet, Take 1 tablet by mouth 2 times daily (with meals), Disp: 180 tablet, Rfl: 3    sildenafil (REVATIO) 20 MG tablet, 1-2 po 1/2-4hrs prior to sexual activity, max one dose per day., Disp: 30 tablet, Rfl: 3    Insulin Degludec 200 UNIT/ML SOPN, Inject 30 units daily at bedtime, Disp: 20 pen, Rfl: 1    aspirin 81 MG tablet, Take 81 mg by mouth daily, Disp: , Rfl:   No Known Allergies    Past Medical History:   Diagnosis Date    Encounter for screening colonoscopy 2019    Erectile dysfunction     Headache     migraine    Hyperlipidemia     Hypertension     Isolated proteinuria     Type 2 diabetes mellitus without complication (HCC)     insulin dependent    Type II or unspecified type diabetes mellitus without mention of complication, not stated as uncontrolled      Past Surgical History:   Procedure Laterality Date    ADENOIDECTOMY AND TYMPANOSTOMY TUBE PLACEMENT      COLONOSCOPY N/A 2019    COLONOSCOPY WITH BIOPSY performed by Angelia Ruelas MD at [address] [place] [address]      age 11 bilateral inguinal    TONSILLECTOMY AND ADENOIDECTOMY      VASECTOMY       Family History   Problem Relation Age of Onset    Thyroid Disease Mother     Diabetes Mother     High Blood Pressure Mother     Arthritis Mother     Cancer Mother     High Cholesterol Mother     Cirrhosis Father     Substance Abuse Father     Diabetes Brother     Diabetes Brother      Social History     Tobacco Use    Smoking status: Former     Packs/day: 1.00     Years: 21.00     Pack years: 21.00     Types: Cigarettes     Start date: 2000     Quit date:      Years since quittin.5    Smokeless tobacco: Former    Tobacco comments:     quit    Vaping Use Vaping Use: Never used   Substance Use Topics    Alcohol use: Yes     Comment: social    Drug use: No      Social History     Social History Narrative    PMH:    Problem List: Essential hypertension, Essential hypertension, Type 2 diabetes mellitus    Health Maintenance:    Influenza Vaccination - (2016)    Medical Problems:    Insulin Dependent Diabetes - DX . Hypertension, Hyperlipidemia    Migraine - neg eval in past    Surgical Hx:    Hernia repair - age 11    T & A, Tympanostomy Tube Insertion            FH:    Father:    . (Hx)    Mother:    . (Hx)    Son 5:    . (Hx)    Grandson 5:    . (Hx)    Dad -  68 cirrhosis, heavy drinker. Mom - thyroid, DM, HTN, colon cancer 64        SH:    . (Marital)Previous [employer] manager; now owns [employer]    moved from [facility]; [address] [place] [address] up [facility]. 7 moves in 20yrs    Personal Habits: Cigarette Use: Patient smoking status is unknown        Vitals:    22 0815   BP: 134/80   Pulse: 91   Temp: 97.4 °F (36.3 °C)   SpO2: 97%   Weight: 221 lb (100.2 kg)      Wt Readings from Last 3 Encounters:   22 221 lb (100.2 kg)   22 219 lb 6.4 oz (99.5 kg)   22 216 lb (98 kg)        Physical Exam  Exam:  Const: Appears comfortable. No signs of acute distress present. Head/Face: Atraumatic on inspection. Eyes: EOMI in both eyes. No discharge from the eyes. PERRL. Sclerae clear. ENMT: Auditory canals normal. Tympanic membranes: intact and translucent. External nose WNL. Nasal mucosa is clear. Oropharynx: No erythema or exudate. Posterior pharynx is normal.  Neck: Supple. Palpation reveals no adenopathy. No masses appreciated. No JVD. Carotids: no  bruits. Resp: Respirations are unlabored. Clear to auscultation. No rales, rhonchi or wheezes appreciated  over the lungs bilaterally. CV: Rate is regular. Rhythm is regular. No gallop or rubs. No heart murmur appreciated. Extremities: No clubbing, cyanosis, or edema.  No calf inflammation or tenderness. Abdomen: Bowel sounds are normoactive. Abdomen is soft, nontender, and nondistended. No  abdominal masses. No palpable hepatosplenomegaly. Lymph: No palpable or visible regional lymphadenopathy. Musculoskeletal: no acute joint inflammation. Skin: Dry and warm with no rash. Skin normal to inspection and palpation overall. Neuro: Alert and oriented. Affect: appropriate. Upper Extremities: 5/5 bilaterally. Lower Extremities:  5/5 bilaterally. Sensation intact to light touch. Reflexes: DTR's are symmetric and 2+ bilaterally. .  Cranial Nerves: Cranial nerves grossly intact. Assessment and Plan:   Diagnosis Orders   1. Mixed hyperlipidemia  Lipid Panel    Comprehensive Metabolic Panel    CK      2. Type 2 diabetes mellitus with hyperglycemia, with long-term current use of insulin ([facility])  Urinalysis    Microalbumin / Creatinine Urine Ratio    Hemoglobin A1C      3. Essential hypertension        4. Male erectile disorder  Testosterone, free, total      5. Proteinuria, unspecified type                Vitamin D deficiency  Currently normal at 32, continue current supplementation        Hypercalcemia  Counseled extensively. Differential reviewed, including serious etiologies. Previously admitted to 2000 spencer a day of whole milk. Counseled. Repeat normalized. Proteinuria  Microalbumin to creatinine ratio was 188 on 7/19 - 123 2/20. His microalbumin to creatinine ratio was 26 on 2/19, 24 on 2/18, 132 on 12/16 and 32 on 9/16, now 208 4/21 and 265 on 6/22. Wanda Fanning Lisinopril increased last time, tolerating sugar control emphasized. Avoid nephrotoxic agents, including NSAIDs and excessive protein. Proper hydration. Saw [facility] nephrology in the past.  States it was a waste of time and declines follow-up. Proper hydration reviewed. Avoid nephrotoxic agents. Sugar control emphasized. On ACE inhibitor although considering reducing if becoming hypotensive. Monitor.   Repeat 1 month    Type 2 diabetes mellitus with hyperglycemia, with long-term current use of insulin (HCC)  counseled. Uncontrolled but improving and working regularly with endocrinologist,  Dr. Sudhakar Jackson. On long-acting insulin plus sliding scale but misses some sliding scale as well as metformin. Watch BS ambulatory. Parameters given. Call if not in range. ER if  dangerous numbers. micro-and macrovascular complications reviewed, hyper  hypoglycemic precautions reviewed. Importance of at least daily foot exams and  yearly eye exams reviewed. Risk of dka reviewed. concerning numbers. Risks of severe diabetic complications reviewed including debilitating/fatal events. Cautions regarding Metformin reviewed including lactic acidosis, proper hydration reviewed. Mixed hyperlipidemia  Very high triglycerides improved, goals reviewed, LDL excellent at 57 HDL low at 31 and counseled on lifestyle education. Defers change in therapy now. Lifestyle modification reviewed. risk of hyperlipidemia reviewed, including cardiovascular, cerebrovascular, peripheral vascular as well as pancreatitis. Other treatment  options reviewed. We could consider switching fenofibrate to gemfibrozil. Tolerating Lovaza with standard precautions. Previously was on Vascepa, but unaffordable. Lifestyle modification emphasized. Sugar control emphasized. Blood work 1 month follow-up then sooner as needed    Essential hypertension  Stable. Counseled. The risks of hypertension and hypotension reviewed. Watch closely ambulatory. Hyper and hypotensive precautions and parameters reviewed and written as well as parameters on pulse, call if out of range, ER dangers numbers. Lifestyle modification reviewed. standard precautions of lisinopril reviewed including RI, electrolyte imbalance, angioedema, cough. Health maintenance examination  Counseled at length 3/18, encouraged yearly physicals     Male erectile disorder  Counseled extensively.  Differential reviewed, including serious etiologies. Treatment  options reviewed. Declines further evaluation. Risks and benefits of Viagra  reviewed and has  with precautions including absolute contra indication with nitrates  and alpha blockers. Counseled on orthostatic precautions. He would like testosterone checked. Controlled with sildenafil   Hyperkalemia  Counseled extensively. Differential reviewed, including serious etiologies. Although there have been multiple false positives at the lab recently, the concerns with a truly positive result reviewed. He is asymptomatic. Low potassium intake reviewed. Proper hydration. Discussed adjusting  ACE inhibitor. Repeat normalized. No flowsheet data found. Plan as above. Counseled extensively and differential diagnoses relevant to above were reviewed, including serious etiologies, risks and complications, especially of left uncontrolled. If relevant, instructions and  alternatives to meds/treatment reviewed, as well as interactions, and  SE's/ADRs reviewed, notify immediately if any, discontinuing new meds if any. Plan made after discussion and shared decision making. In summary, numbers have improved, he does feel well, continue current management, continue per specialist, blood work 1 month follow-up 5 weeks sooner as needed. And then likely every 3 months sooner as needed        As long as symptoms steadily improve/resolve, and medical conditions follow the expected course, FU as below, sooner PRN. Return in about 5 weeks (around 9/8/2022), or if symptoms worsen or fail to improve. Educational materials and/or home exercises printed for patient's review and were included in patient instructions on his/her After Visit Summary and given to patient at the end of visit. After discussion, patient and/or guardian verbalizes understanding, agrees, feels comfortable with and wishes to proceed with above treatment plan.  Call for any pending results, FU sooner if abnormal, as needed or if any current symptoms persist/worsen. Advised patient to call with any new medication issues, and read all Rx info from pharmacy to assure aware of all possible risks and side effects of medication before taking. Reviewed age and gender appropriate health screening exams and vaccinations. Advised patient regarding importance of keeping up with recommended health maintenance and to schedule as soon as possible if overdue, as this is important in assessing for undiagnosed pathology, especially cancer, as well as protecting against potentially harmful/life threatening disease. Patient and/or guardian verbalizes understanding and agrees with above counseling, assessment and plan. All questions answered. Signs and symptoms to watch for discussed, serious signs and symptoms reviewed. ER if any. Over 44 minutes  spent with the patient in reviewing records, reviewing with patient/family, counseling, ordering,  prescribing, completing h&p, etc., with over 50% of the time spent face to face counseling. Balwinder Marinelli MD    Patients are advised to check with insurance company to ensure coverage and to fully understand benefits and cost prior to any testing. This note was created with the assistance of voice recognition software. Document was reviewed however may contain grammatical errors.

## 2022-08-17 DIAGNOSIS — E78.2 MIXED HYPERLIPIDEMIA: ICD-10-CM

## 2022-08-17 RX ORDER — OMEGA-3-ACID ETHYL ESTERS 1 G/1
2 CAPSULE, LIQUID FILLED ORAL 2 TIMES DAILY
Qty: 120 CAPSULE | Refills: 1 | Status: SHIPPED | OUTPATIENT
Start: 2022-08-17

## 2022-08-29 DIAGNOSIS — Z79.4 TYPE 2 DIABETES MELLITUS WITHOUT COMPLICATION, WITH LONG-TERM CURRENT USE OF INSULIN (HCC): ICD-10-CM

## 2022-08-29 DIAGNOSIS — E11.9 TYPE 2 DIABETES MELLITUS WITHOUT COMPLICATION, WITH LONG-TERM CURRENT USE OF INSULIN (HCC): ICD-10-CM

## 2022-08-30 RX ORDER — INSULIN ASPART 100 [IU]/ML
INJECTION, SOLUTION INTRAVENOUS; SUBCUTANEOUS
Qty: 10 PEN | Refills: 1 | Status: SHIPPED | OUTPATIENT
Start: 2022-08-30

## 2022-11-22 ENCOUNTER — OFFICE VISIT (OUTPATIENT)
Dept: ENDOCRINOLOGY | Age: 52
End: 2022-11-22
Payer: COMMERCIAL

## 2022-11-22 VITALS
SYSTOLIC BLOOD PRESSURE: 156 MMHG | HEIGHT: 67 IN | HEART RATE: 96 BPM | WEIGHT: 220 LBS | RESPIRATION RATE: 18 BRPM | DIASTOLIC BLOOD PRESSURE: 90 MMHG | OXYGEN SATURATION: 96 % | BODY MASS INDEX: 34.53 KG/M2

## 2022-11-22 DIAGNOSIS — Z91.119 DIETARY NONCOMPLIANCE: ICD-10-CM

## 2022-11-22 DIAGNOSIS — E11.29 TYPE 2 DIABETES MELLITUS WITH MICROALBUMINURIA, WITH LONG-TERM CURRENT USE OF INSULIN (HCC): ICD-10-CM

## 2022-11-22 DIAGNOSIS — Z79.4 TYPE 2 DIABETES MELLITUS WITH HYPERGLYCEMIA, WITH LONG-TERM CURRENT USE OF INSULIN (HCC): Primary | ICD-10-CM

## 2022-11-22 DIAGNOSIS — Z79.4 TYPE 2 DIABETES MELLITUS WITH MICROALBUMINURIA, WITH LONG-TERM CURRENT USE OF INSULIN (HCC): ICD-10-CM

## 2022-11-22 DIAGNOSIS — E55.9 VITAMIN D DEFICIENCY: ICD-10-CM

## 2022-11-22 DIAGNOSIS — E11.65 TYPE 2 DIABETES MELLITUS WITH HYPERGLYCEMIA, WITH LONG-TERM CURRENT USE OF INSULIN (HCC): Primary | ICD-10-CM

## 2022-11-22 DIAGNOSIS — E78.2 MIXED HYPERLIPIDEMIA: ICD-10-CM

## 2022-11-22 DIAGNOSIS — E78.1 HYPERTRIGLYCERIDEMIA: ICD-10-CM

## 2022-11-22 DIAGNOSIS — R80.9 TYPE 2 DIABETES MELLITUS WITH MICROALBUMINURIA, WITH LONG-TERM CURRENT USE OF INSULIN (HCC): ICD-10-CM

## 2022-11-22 LAB — HBA1C MFR BLD: 11.9 %

## 2022-11-22 PROCEDURE — 83036 HEMOGLOBIN GLYCOSYLATED A1C: CPT | Performed by: CLINICAL NURSE SPECIALIST

## 2022-11-22 PROCEDURE — 3074F SYST BP LT 130 MM HG: CPT | Performed by: CLINICAL NURSE SPECIALIST

## 2022-11-22 PROCEDURE — 3078F DIAST BP <80 MM HG: CPT | Performed by: CLINICAL NURSE SPECIALIST

## 2022-11-22 PROCEDURE — 99214 OFFICE O/P EST MOD 30 MIN: CPT | Performed by: CLINICAL NURSE SPECIALIST

## 2022-11-22 PROCEDURE — 3046F HEMOGLOBIN A1C LEVEL >9.0%: CPT | Performed by: CLINICAL NURSE SPECIALIST

## 2022-11-22 RX ORDER — INSULIN ASPART 100 [IU]/ML
INJECTION, SOLUTION INTRAVENOUS; SUBCUTANEOUS
Qty: 10 ADJUSTABLE DOSE PRE-FILLED PEN SYRINGE | Refills: 4
Start: 2022-11-22

## 2022-11-22 RX ORDER — SEMAGLUTIDE 1.34 MG/ML
INJECTION, SOLUTION SUBCUTANEOUS
Qty: 3 ADJUSTABLE DOSE PRE-FILLED PEN SYRINGE | Refills: 4 | Status: SHIPPED | OUTPATIENT
Start: 2022-11-22

## 2022-11-22 NOTE — PROGRESS NOTES
[address] [place] [address] Department of Endocrinology Diabetes and Metabolism   [address] [facility] [address]   Phone: [phone]  Fax: [phone]    Date of Service: 11/22/2022    Primary Care Physician: Pieter Davison MD  Provider: ALISHA Stover      Reason for the visit:  DM type 2     History of Present Illness: The history is provided by the patient. No  was used. Accuracy of the patient data is excellent.   Manny Dakins is a very pleasant 50 y.o. male seen today for diabetes management     Manny Dakins was diagnosed with diabetes at age 47 and he is currently on Tresiba 30 units daily at bedtime, Novolog 20 units with meals + ss 3:50>150, Metformin 1000 mg BID  Attempted invokana but caused mycotic infection   The patient has been checking blood sugar none recently   Pt reported being very busy at work and for this reason he wasn't strictly following DM diet recently     Lab Results   Component Value Date/Time    LABA1C 11.9 11/22/2022 07:48 AM    LABA1C 12.3 06/08/2022 07:16 AM    LABA1C 14.6 01/11/2022 08:32 AM    LABA1C 10.0 04/07/2021 07:38 AM     Patient has had no hypoglycemic episodes   Since last OV, the patient has been mindful of what has been eating and trying to follow diabetes diet as encouraged   I reviewed current medications and the patient has no issues with diabetes medications  Manny Dakins is up to date with eye exam and denied any history of diabetic retinopathy   The patient performs  own feet care  Microvascular complications:  No Retinopathy, + Nephropathy or Neuropathy   Macrovascular complications: no CAD, PVD, or Stroke  The patient refuses Flushot and pneumonia vaccine   No HX of pancreatitis  No Hx of MTC  No HX of gastroparesis   No HX of UTI/Mycotic infection       PAST MEDICAL HISTORY   Past Medical History:   Diagnosis Date    Encounter for screening colonoscopy 4/22/2019    Erectile dysfunction     Headache     migraine    Hyperlipidemia     Hypertension     Isolated proteinuria     Type 2 diabetes mellitus without complication (HCC)     insulin dependent    Type II or unspecified type diabetes mellitus without mention of complication, not stated as uncontrolled        PAST SURGICAL HISTORY   Past Surgical History:   Procedure Laterality Date    ADENOIDECTOMY AND TYMPANOSTOMY TUBE PLACEMENT      COLONOSCOPY N/A 4/22/2019    COLONOSCOPY WITH BIOPSY performed by Eden King MD at [address] [place] [address]      age 11 bilateral inguinal    TONSILLECTOMY AND ADENOIDECTOMY      VASECTOMY         SOCIAL HISTORY   Tobacco:   reports that he quit smoking about 22 months ago. His smoking use included cigarettes. He started smoking about 21 years ago. He has a 21.00 pack-year smoking history. He has quit using smokeless tobacco.  Alcohol:   reports current alcohol use. Drugs:   reports no history of drug use. FAMILY HISTORY   Family History   Problem Relation Age of Onset    Thyroid Disease Mother     Diabetes Mother     High Blood Pressure Mother     Arthritis Mother     Cancer Mother     High Cholesterol Mother     Cirrhosis Father     Substance Abuse Father     Diabetes Brother     Diabetes Brother        ALLERGIES AND DRUG REACTIONS   No Known Allergies    CURRENT MEDICATIONS   Current Outpatient Medications   Medication Sig Dispense Refill    Semaglutide,0.25 or 0.5MG/DOS, (OZEMPIC, 0.25 OR 0.5 MG/DOSE,) 2 MG/1.5ML SOPN 0.5 mg once weekly subcutaneous 3 Adjustable Dose Pre-filled Pen Syringe 4    insulin aspart (NOVOLOG FLEXPEN) 100 UNIT/ML injection pen INJECT 20 UNITS INTO THE SKIN WITH MEALS + SLIDING SCALE 3:50>150.  MAX DAILY 90 UNITS 10 Adjustable Dose Pre-filled Pen Syringe 4    omega-3 acid ethyl esters (LOVAZA) 1 g capsule Take 2 capsules by mouth 2 times daily 120 capsule 1    Insulin Pen Needle (BD PEN NEEDLE LILLY U/F) 32G X 4 MM MISC Uses with insulin 4 times a day 250 each 0 atorvastatin (LIPITOR) 40 MG tablet Take 1 tablet by mouth daily 90 tablet 3    fenofibric acid (TRILIPIX) 135 MG CPDR capsule Take 1 capsule by mouth daily 90 capsule 3    lisinopril (PRINIVIL;ZESTRIL) 10 MG tablet Take 1 tablet by mouth daily 90 tablet 3    metFORMIN (GLUCOPHAGE) 1000 MG tablet Take 1 tablet by mouth 2 times daily (with meals) 180 tablet 3    sildenafil (REVATIO) 20 MG tablet 1-2 po 1/2-4hrs prior to sexual activity, max one dose per day. 30 tablet 3    Insulin Degludec 200 UNIT/ML SOPN Inject 30 units daily at bedtime 20 pen 1    aspirin 81 MG tablet Take 81 mg by mouth daily       No current facility-administered medications for this visit. Review of Systems  Constitutional: No fever, no chills, no diaphoresis, no generalized weakness. HEENT: No blurred vision, No sore throat, no ear pain, no hair loss  Neck: denied any neck swelling, difficulty swallowing,   Cardio-pulmonary: No CP, SOB or palpitation, No orthopnea or PND. No cough or wheezing. GI: No N/V/D, no constipation, No abdominal pain, no melena or hematochezia   : Denied any dysuria, hematuria, flank pain, discharge, or incontinence. Skin: denied any rash, ulcer, Hirsute, or hyperpigmentation. MSK: denied any joint deformity, joint pain/swelling, muscle pain, or back pain. Neuro: no numbness, no tingling, no weakness, _    OBJECTIVE    BP (!) 156/90   Pulse 96   Resp 18   Ht 5' 7\" (1.702 m)   Wt 220 lb (99.8 kg)   SpO2 96%   BMI 34.46 kg/m²   BP Readings from Last 4 Encounters:   11/22/22 (!) 156/90   08/04/22 134/80   06/23/22 (!) 153/98   06/09/22 134/80     Wt Readings from Last 6 Encounters:   11/22/22 220 lb (99.8 kg)   08/04/22 221 lb (100.2 kg)   06/23/22 219 lb 6.4 oz (99.5 kg)   06/09/22 216 lb (98 kg)   05/26/20 204 lb (92.5 kg)   03/12/20 200 lb (90.7 kg)     hysical examination:  General: awake alert, oriented x3, no abnormal position or movements.   HEENT: normocephalic non-traumatic, no exophthalmos Neck: supple, no LN enlargement, no thyromegaly, no thyroid tenderness, no JVD. Pulm: Clear equal air entry no added sounds, no wheezing or rhonchi    CVS: S1 + S2, no murmur, no heave. Dorsalis pedis pulse palpable   Abd: soft lax, no tenderness, no organomegaly, audible bowel sounds. Skin: warm, no lesions, no rash.  No callus, no Ulcers, No acanthosis nigricans  Musculoskeletal: No back tenderness, no kyphosis/scoliosis    Neuro: CN intact, Monofilament sensation present bilateral , muscle power normal  Psych: normal mood, and affect      Review of Laboratory Data:  I personally reviewed the following lab:  Lab Results   Component Value Date/Time    WBC 4.9 06/08/2022 07:16 AM    RBC 4.81 06/08/2022 07:16 AM    HGB 14.0 06/08/2022 07:16 AM    HCT 42.5 06/08/2022 07:16 AM    MCV 88.4 06/08/2022 07:16 AM    MCH 29.1 06/08/2022 07:16 AM    MCHC 32.9 06/08/2022 07:16 AM    RDW 12.9 06/08/2022 07:16 AM     06/08/2022 07:16 AM    MPV 11.4 06/08/2022 07:16 AM      Lab Results   Component Value Date/Time     08/01/2022 08:39 AM    K 5.0 08/01/2022 08:39 AM    CO2 24 08/01/2022 08:39 AM    BUN 17 08/01/2022 08:39 AM    CREATININE 0.7 08/01/2022 08:39 AM    CALCIUM 9.7 08/01/2022 08:39 AM    LABGLOM >60 08/01/2022 08:39 AM    GFRAA >60 08/01/2022 08:39 AM      Lab Results   Component Value Date/Time    TSH 2.640 06/08/2022 07:16 AM     Lab Results   Component Value Date/Time    LABA1C 11.9 11/22/2022 07:48 AM    GLUCOSE 300 08/01/2022 08:39 AM    MALBCR 265.6 06/08/2022 07:16 AM    LABMICR 180.6 06/08/2022 07:16 AM    LABCREA 68 06/08/2022 07:16 AM     Lab Results   Component Value Date/Time    LABA1C 11.9 11/22/2022 07:48 AM    LABA1C 12.3 06/08/2022 07:16 AM    LABA1C 14.6 01/11/2022 08:32 AM     Lab Results   Component Value Date/Time    TRIG 323 08/01/2022 08:39 AM    HDL 31 08/01/2022 08:39 AM    LDLCALC 57 08/01/2022 08:39 AM    CHOL 153 08/01/2022 08:39 AM     Lab Results   Component Value Date/Time WDRJ80 31 06/08/2022 07:16 AM    VITD25 25 01/11/2022 08:32 AM       Medical Records/Labs/Images review:   I personally reviewed and summarized previous records   All labs and imaging studies were independently reviewed     [facility], a 50 y.o.-old male seen in for the following issues     Diabetes Mellitus Type 2       Pt DM not well controlled. Hemoglobin A1c 11.9%  Unfortunately patient has not checked blood sugars recently  Patient had [facility] however was denied by insurance  Will reattempt  Continue Metformin 1000 mg twice a day, Tresiba 30 units daily at bedtime ,  Continue Novolog 20 units with meals + ss 3:50>150   Start Ozempic 0.25 mg once weekly for 2 weeks then increase to 0.5 mg thereafter  No contraindications. Counseled on side effects  Advised patient to check blood sugars 4 times per day  Send blood glucose log in 2 weeks  Discussed with patient A1c and blood sugar goals   Encourage diet and exercise  Last microalbumin creatinine ratio elevated  Continue lisinopril. I encouraged optimal blood glucose control    Dietary noncompliance  Improved since last visit   Discussed with patient the importance of eating consistent carbohydrate meals, avoiding high glycemic index food. Also, discussed with patient the risk and negative consequences of dietary noncompliance on blood glucose control, blood pressure and weight    vitD deficiency   Continue vitD supplement     Hyperlipidemia  Continue Trilpix and Lipitor as above  I encouraged diet and exercise  Advise optimal blood glucose control    Return in about 3 months (around 2/22/2023). The above issues were reviewed with the patient who understood and agreed with the plan. Thank you for allowing us to participate in the care of this patient. Please do not hesitate to contact us with any additional questions. Diagnosis Orders   1.  Type 2 diabetes mellitus with hyperglycemia, with long-term current use of insulin (HCC)  POCT glycosylated hemoglobin (Hb A1C)      2. Dietary noncompliance        3. Vitamin D deficiency        4. Hypertriglyceridemia        5. Type 2 diabetes mellitus with microalbuminuria, with long-term current use of insulin ([address]          ALISHA Jones     [facility] Diabetes Care and Endocrinology   [address] [place] [id number]   Phone: [phone]  Fax: [phone]  --------------------------------------------  An electronic signature was used to authenticate this note.  ALISHA Jones  on 11/22/2022 at 8:12 AM

## 2022-11-23 RX ORDER — OMEGA-3-ACID ETHYL ESTERS 1 G/1
2 CAPSULE, LIQUID FILLED ORAL 2 TIMES DAILY
Qty: 120 CAPSULE | Refills: 0 | Status: SHIPPED | OUTPATIENT
Start: 2022-11-23

## 2023-02-22 DIAGNOSIS — E11.9 TYPE 2 DIABETES MELLITUS WITHOUT COMPLICATION, WITH LONG-TERM CURRENT USE OF INSULIN (HCC): ICD-10-CM

## 2023-02-22 DIAGNOSIS — E78.2 MIXED HYPERLIPIDEMIA: ICD-10-CM

## 2023-02-22 DIAGNOSIS — Z79.4 TYPE 2 DIABETES MELLITUS WITHOUT COMPLICATION, WITH LONG-TERM CURRENT USE OF INSULIN (HCC): ICD-10-CM

## 2023-02-23 NOTE — TELEPHONE ENCOUNTER
Is this not being prescribed by and managed by his endocrinologist?  Make sure he is following with endocrinologist and has appropriate follow-up with me as well.   Send back if there is any issue

## 2023-02-24 DIAGNOSIS — E11.9 TYPE 2 DIABETES MELLITUS WITHOUT COMPLICATION, WITH LONG-TERM CURRENT USE OF INSULIN (HCC): ICD-10-CM

## 2023-02-24 DIAGNOSIS — Z79.4 TYPE 2 DIABETES MELLITUS WITHOUT COMPLICATION, WITH LONG-TERM CURRENT USE OF INSULIN (HCC): ICD-10-CM

## 2023-02-24 RX ORDER — OMEGA-3-ACID ETHYL ESTERS 1 G/1
2 CAPSULE, LIQUID FILLED ORAL 2 TIMES DAILY
Qty: 120 CAPSULE | Refills: 1 | Status: SHIPPED | OUTPATIENT
Start: 2023-02-24

## 2023-04-27 DIAGNOSIS — E11.9 TYPE 2 DIABETES MELLITUS WITHOUT COMPLICATIONS (HCC): ICD-10-CM

## 2023-04-27 RX ORDER — INSULIN ASPART 100 [IU]/ML
INJECTION, SOLUTION INTRAVENOUS; SUBCUTANEOUS
Qty: 30 ADJUSTABLE DOSE PRE-FILLED PEN SYRINGE | Refills: 1 | Status: SHIPPED | OUTPATIENT
Start: 2023-04-27

## 2023-06-07 DIAGNOSIS — E11.65 TYPE 2 DIABETES MELLITUS WITH HYPERGLYCEMIA, WITH LONG-TERM CURRENT USE OF INSULIN (HCC): ICD-10-CM

## 2023-06-07 DIAGNOSIS — Z79.4 TYPE 2 DIABETES MELLITUS WITHOUT COMPLICATION, WITH LONG-TERM CURRENT USE OF INSULIN (HCC): ICD-10-CM

## 2023-06-07 DIAGNOSIS — Z79.4 TYPE 2 DIABETES MELLITUS WITH HYPERGLYCEMIA, WITH LONG-TERM CURRENT USE OF INSULIN (HCC): ICD-10-CM

## 2023-06-07 DIAGNOSIS — N52.9 MALE ERECTILE DISORDER: ICD-10-CM

## 2023-06-07 DIAGNOSIS — I10 ESSENTIAL HYPERTENSION: ICD-10-CM

## 2023-06-07 DIAGNOSIS — E78.2 MIXED HYPERLIPIDEMIA: ICD-10-CM

## 2023-06-07 DIAGNOSIS — E11.9 TYPE 2 DIABETES MELLITUS WITHOUT COMPLICATION, WITH LONG-TERM CURRENT USE OF INSULIN (HCC): ICD-10-CM

## 2023-06-08 RX ORDER — SEMAGLUTIDE 1.34 MG/ML
INJECTION, SOLUTION SUBCUTANEOUS
Qty: 3 ADJUSTABLE DOSE PRE-FILLED PEN SYRINGE | Refills: 4 | Status: SHIPPED | OUTPATIENT
Start: 2023-06-08

## 2023-06-08 RX ORDER — ATORVASTATIN CALCIUM 40 MG/1
40 TABLET, FILM COATED ORAL DAILY
Qty: 30 TABLET | Refills: 0 | Status: SHIPPED | OUTPATIENT
Start: 2023-06-08

## 2023-06-08 RX ORDER — FENOFIBRIC ACID 135 MG/1
135 CAPSULE, DELAYED RELEASE ORAL DAILY
Qty: 30 CAPSULE | Refills: 0 | Status: SHIPPED | OUTPATIENT
Start: 2023-06-08

## 2023-06-08 RX ORDER — LISINOPRIL 10 MG/1
10 TABLET ORAL DAILY
Qty: 30 TABLET | Refills: 0 | Status: SHIPPED | OUTPATIENT
Start: 2023-06-08

## 2023-06-08 RX ORDER — OMEGA-3-ACID ETHYL ESTERS 1 G/1
2 CAPSULE, LIQUID FILLED ORAL 2 TIMES DAILY
Qty: 120 CAPSULE | Refills: 0 | Status: SHIPPED | OUTPATIENT
Start: 2023-06-08

## 2023-06-08 RX ORDER — SILDENAFIL CITRATE 20 MG/1
TABLET ORAL
Qty: 30 TABLET | Refills: 0 | Status: SHIPPED | OUTPATIENT
Start: 2023-06-08

## 2023-06-13 DIAGNOSIS — E78.2 MIXED HYPERLIPIDEMIA: ICD-10-CM

## 2023-06-13 DIAGNOSIS — E11.65 TYPE 2 DIABETES MELLITUS WITH HYPERGLYCEMIA, WITH LONG-TERM CURRENT USE OF INSULIN (HCC): ICD-10-CM

## 2023-06-13 DIAGNOSIS — N52.9 MALE ERECTILE DISORDER: ICD-10-CM

## 2023-06-13 DIAGNOSIS — Z79.4 TYPE 2 DIABETES MELLITUS WITH HYPERGLYCEMIA, WITH LONG-TERM CURRENT USE OF INSULIN (HCC): ICD-10-CM

## 2023-06-13 LAB
ALBUMIN SERPL-MCNC: 4.2 G/DL (ref 3.5–5.2)
ALP SERPL-CCNC: 108 U/L (ref 40–129)
ALT SERPL-CCNC: 23 U/L (ref 0–40)
AMORPH SED URNS QL MICRO: ABNORMAL
ANION GAP SERPL CALCULATED.3IONS-SCNC: 13 MMOL/L (ref 7–16)
AST SERPL-CCNC: 17 U/L (ref 0–39)
BACTERIA URNS QL MICRO: ABNORMAL /HPF
BILIRUB SERPL-MCNC: 0.3 MG/DL (ref 0–1.2)
BILIRUB UR QL STRIP: NEGATIVE
BUN SERPL-MCNC: 12 MG/DL (ref 6–20)
CALCIUM SERPL-MCNC: 9.5 MG/DL (ref 8.6–10.2)
CHLORIDE SERPL-SCNC: 100 MMOL/L (ref 98–107)
CHOLESTEROL, TOTAL: 155 MG/DL (ref 0–199)
CK SERPL-CCNC: 133 U/L (ref 20–200)
CLARITY UR: ABNORMAL
CO2 SERPL-SCNC: 22 MMOL/L (ref 22–29)
COLOR UR: YELLOW
CREAT SERPL-MCNC: 0.7 MG/DL (ref 0.7–1.2)
CREAT UR-MCNC: 104 MG/DL (ref 40–278)
EPI CELLS #/AREA URNS HPF: ABNORMAL /HPF
GLUCOSE SERPL-MCNC: 283 MG/DL (ref 74–99)
GLUCOSE UR STRIP-MCNC: >=1000 MG/DL
HBA1C MFR BLD: 11.4 % (ref 4–5.6)
HDLC SERPL-MCNC: 33 MG/DL
HGB UR QL STRIP: NEGATIVE
KETONES UR STRIP-MCNC: NEGATIVE MG/DL
LDLC SERPL CALC-MCNC: 64 MG/DL (ref 0–99)
LEUKOCYTE ESTERASE UR QL STRIP: NEGATIVE
MICROALBUMIN UR-MCNC: 189.2 MG/L
MICROALBUMIN/CREAT UR-RTO: 181.9 (ref 0–30)
NITRITE UR QL STRIP: NEGATIVE
PH UR STRIP: 5.5 [PH] (ref 5–9)
POTASSIUM SERPL-SCNC: 4.4 MMOL/L (ref 3.5–5)
PROT SERPL-MCNC: 7.3 G/DL (ref 6.4–8.3)
PROT UR STRIP-MCNC: 30 MG/DL
RBC #/AREA URNS HPF: ABNORMAL /HPF (ref 0–2)
SODIUM SERPL-SCNC: 135 MMOL/L (ref 132–146)
SP GR UR STRIP: 1.02 (ref 1–1.03)
TRIGL SERPL-MCNC: 290 MG/DL (ref 0–149)
UROBILINOGEN UR STRIP-ACNC: 0.2 E.U./DL
VLDLC SERPL CALC-MCNC: 58 MG/DL
WBC #/AREA URNS HPF: ABNORMAL /HPF (ref 0–5)

## 2023-06-13 NOTE — RESULT ENCOUNTER NOTE
Hemoglobin A1c very high at 11.4, glucose 283. Make sure he is following with endocrinologist.  Keep follow-up with me as well.   Sooner as needed

## 2023-06-14 LAB
SHBG SERPL-SCNC: 19 NMOL/L (ref 11–80)
TESTOST FREE SERPL-MCNC: 81.6 PG/ML (ref 47–244)
TESTOST SERPL-MCNC: 312 NG/DL (ref 220–1000)

## 2023-07-23 DIAGNOSIS — E11.65 TYPE 2 DIABETES MELLITUS WITH HYPERGLYCEMIA (HCC): ICD-10-CM

## 2023-07-24 RX ORDER — PEN NEEDLE, DIABETIC 32GX 5/32"
NEEDLE, DISPOSABLE MISCELLANEOUS
Qty: 100 EACH | Refills: 2 | Status: SHIPPED | OUTPATIENT
Start: 2023-07-24

## 2023-09-15 DIAGNOSIS — Z11.4 ENCOUNTER FOR SCREENING FOR HIV: ICD-10-CM

## 2023-09-15 DIAGNOSIS — E55.9 VITAMIN D DEFICIENCY: ICD-10-CM

## 2023-09-15 DIAGNOSIS — E11.65 TYPE 2 DIABETES MELLITUS WITH HYPERGLYCEMIA, WITH LONG-TERM CURRENT USE OF INSULIN (HCC): ICD-10-CM

## 2023-09-15 DIAGNOSIS — Z79.4 TYPE 2 DIABETES MELLITUS WITH HYPERGLYCEMIA, WITH LONG-TERM CURRENT USE OF INSULIN (HCC): ICD-10-CM

## 2023-09-15 DIAGNOSIS — I10 ESSENTIAL HYPERTENSION: ICD-10-CM

## 2023-09-15 DIAGNOSIS — Z12.5 PROSTATE CANCER SCREENING: ICD-10-CM

## 2023-09-15 DIAGNOSIS — E78.2 MIXED HYPERLIPIDEMIA: ICD-10-CM

## 2023-09-15 DIAGNOSIS — Z11.59 ENCOUNTER FOR HEPATITIS C SCREENING TEST FOR LOW RISK PATIENT: ICD-10-CM

## 2023-09-15 LAB
ABSOLUTE IMMATURE GRANULOCYTE: <0.03 K/UL (ref 0–0.58)
ALBUMIN SERPL-MCNC: 4.7 G/DL (ref 3.5–5.2)
ALP BLD-CCNC: 90 U/L (ref 40–129)
ALT SERPL-CCNC: 28 U/L (ref 0–40)
ANION GAP SERPL CALCULATED.3IONS-SCNC: 16 MMOL/L (ref 7–16)
AST SERPL-CCNC: 20 U/L (ref 0–39)
BASOPHILS ABSOLUTE: 0.07 K/UL (ref 0–0.2)
BASOPHILS RELATIVE PERCENT: 1 % (ref 0–2)
BILIRUB SERPL-MCNC: 0.3 MG/DL (ref 0–1.2)
BILIRUBIN URINE: NEGATIVE
BUN BLDV-MCNC: 17 MG/DL (ref 6–20)
CALCIUM SERPL-MCNC: 10.2 MG/DL (ref 8.6–10.2)
CHLORIDE BLD-SCNC: 100 MMOL/L (ref 98–107)
CHOLESTEROL: 159 MG/DL
CO2: 20 MMOL/L (ref 22–29)
COLOR: YELLOW
COMMENT: ABNORMAL
CREAT SERPL-MCNC: 0.8 MG/DL (ref 0.7–1.2)
CREATININE URINE: 52.1 MG/DL (ref 40–278)
EOSINOPHILS ABSOLUTE: 0.1 K/UL (ref 0.05–0.5)
EOSINOPHILS RELATIVE PERCENT: 1 % (ref 0–6)
GFR SERPL CREATININE-BSD FRML MDRD: >60 ML/MIN/1.73M2
GLUCOSE BLD-MCNC: 190 MG/DL (ref 74–99)
GLUCOSE URINE: 100 MG/DL
HBA1C MFR BLD: 11.9 % (ref 4–5.6)
HCT VFR BLD CALC: 43.8 % (ref 37–54)
HDLC SERPL-MCNC: 35 MG/DL
HEMOGLOBIN: 14.4 G/DL (ref 12.5–16.5)
HEPATITIS C ANTIBODY: NONREACTIVE
HIV AG/AB: NONREACTIVE
IMMATURE GRANULOCYTES: 0 % (ref 0–5)
KETONES, URINE: NEGATIVE MG/DL
LDL CHOLESTEROL: 76 MG/DL
LEUKOCYTE ESTERASE, URINE: NEGATIVE
LYMPHOCYTES ABSOLUTE: 2.02 K/UL (ref 1.5–4)
LYMPHOCYTES RELATIVE PERCENT: 27 % (ref 20–42)
MCH RBC QN AUTO: 29.1 PG (ref 26–35)
MCHC RBC AUTO-ENTMCNC: 32.9 G/DL (ref 32–34.5)
MCV RBC AUTO: 88.7 FL (ref 80–99.9)
MICROALBUMIN/CREAT 24H UR: 95 MG/L (ref 0–19)
MICROALBUMIN/CREAT UR-RTO: 183 MCG/MG CREAT (ref 0–30)
MONOCYTES ABSOLUTE: 0.52 K/UL (ref 0.1–0.95)
MONOCYTES RELATIVE PERCENT: 7 % (ref 2–12)
NEUTROPHILS ABSOLUTE: 4.7 K/UL (ref 1.8–7.3)
NEUTROPHILS RELATIVE PERCENT: 63 % (ref 43–80)
NITRITE, URINE: NEGATIVE
PDW BLD-RTO: 12.9 % (ref 11.5–15)
PH UA: 6 (ref 5–9)
PLATELET # BLD: 235 K/UL (ref 130–450)
PMV BLD AUTO: 10.8 FL (ref 7–12)
POTASSIUM SERPL-SCNC: 4.9 MMOL/L (ref 3.5–5)
PROSTATE SPECIFIC ANTIGEN: 0.52 NG/ML (ref 0–4)
PROTEIN UA: NEGATIVE MG/DL
RBC # BLD: 4.94 M/UL (ref 3.8–5.8)
SODIUM BLD-SCNC: 136 MMOL/L (ref 132–146)
SPECIFIC GRAVITY UA: 1.01 (ref 1–1.03)
TOTAL CK: 190 U/L (ref 20–200)
TOTAL PROTEIN: 7.7 G/DL (ref 6.4–8.3)
TRIGL SERPL-MCNC: 242 MG/DL
TSH SERPL DL<=0.05 MIU/L-ACNC: 2.48 UIU/ML (ref 0.27–4.2)
TURBIDITY: CLEAR
URINE HGB: NEGATIVE
UROBILINOGEN, URINE: 0.2 EU/DL (ref 0–1)
VITAMIN D 25-HYDROXY: 28.9 NG/ML (ref 30–100)
VLDLC SERPL CALC-MCNC: 48 MG/DL
WBC # BLD: 7.4 K/UL (ref 4.5–11.5)

## 2023-09-18 ENCOUNTER — OFFICE VISIT (OUTPATIENT)
Dept: PRIMARY CARE CLINIC | Age: 53
End: 2023-09-18
Payer: COMMERCIAL

## 2023-09-18 VITALS
DIASTOLIC BLOOD PRESSURE: 68 MMHG | OXYGEN SATURATION: 97 % | HEIGHT: 67 IN | WEIGHT: 219.8 LBS | TEMPERATURE: 97.3 F | BODY MASS INDEX: 34.5 KG/M2 | SYSTOLIC BLOOD PRESSURE: 128 MMHG | HEART RATE: 90 BPM

## 2023-09-18 DIAGNOSIS — R80.9 PROTEINURIA, UNSPECIFIED TYPE: ICD-10-CM

## 2023-09-18 DIAGNOSIS — E55.9 VITAMIN D DEFICIENCY: ICD-10-CM

## 2023-09-18 DIAGNOSIS — E11.65 TYPE 2 DIABETES MELLITUS WITH HYPERGLYCEMIA, WITH LONG-TERM CURRENT USE OF INSULIN (HCC): ICD-10-CM

## 2023-09-18 DIAGNOSIS — Z00.00 HEALTH CARE MAINTENANCE: Primary | ICD-10-CM

## 2023-09-18 DIAGNOSIS — I10 ESSENTIAL HYPERTENSION: ICD-10-CM

## 2023-09-18 DIAGNOSIS — E78.2 MIXED HYPERLIPIDEMIA: ICD-10-CM

## 2023-09-18 DIAGNOSIS — Z79.4 TYPE 2 DIABETES MELLITUS WITH HYPERGLYCEMIA, WITH LONG-TERM CURRENT USE OF INSULIN (HCC): ICD-10-CM

## 2023-09-18 PROCEDURE — 93000 ELECTROCARDIOGRAM COMPLETE: CPT | Performed by: FAMILY MEDICINE

## 2023-09-18 PROCEDURE — 3078F DIAST BP <80 MM HG: CPT | Performed by: FAMILY MEDICINE

## 2023-09-18 PROCEDURE — 3074F SYST BP LT 130 MM HG: CPT | Performed by: FAMILY MEDICINE

## 2023-09-18 PROCEDURE — 99396 PREV VISIT EST AGE 40-64: CPT | Performed by: FAMILY MEDICINE

## 2023-09-18 PROCEDURE — 99214 OFFICE O/P EST MOD 30 MIN: CPT | Performed by: FAMILY MEDICINE

## 2023-09-18 NOTE — PROGRESS NOTES
Tariq Mares : 1970 Sex: male  Age: 48 y.o. Chief Complaint   Patient presents with    Annual Exam           Health Maintenance    Discuss Labs       HPI  HPI:    Patient presents today for physical.  Unfortunately has not been following with the endocrinologist, ran out of his Ozempic, he is taking his other meds. Sugars have not been controlled. Risks of serious complications related to especially untreated diabetes reviewed including sudden DF event, and various macrovascular/microvascular complications he understands. We did offer to restart his Ozempic, he will defer to endocrinologist.  Otherwise feeling well and labs otherwise overall stable. He did see cardiologist . He is asymptomatic and defers other E/T now. Risk of cardiovascular disease reviewed. Asymptomatic    Health Maintenance:  Proper diet reviewed including Mediterranean and DASH diets. Counseled on healthy weight, appropriate exercise, avoidance of tobacco, and recommendations for minimal to no alcohol consumption. Counseled on the potential pros and cons of vitamins and supplements. Reviewed the recommendations and risk/benefits of vaccines including moderna x 2, not interested in booster; Td/Tdap (Declines) ,  prevnar 20 (Declines)  , flu vaccine (Declines) , Hepatitis vaccines (counseled),   and shingrix (patient had chicken pox) (counseled). HIV and Hep C screening guidelines were reviewed. Importance of regular eye (utd, early cataracts and ? Early glaucoma being monitored) and dental exams (behind/will schedule) and health reviewed. Risks/Benefits of ASA reviewed and discussed latest guidelines. Sun protection reviewed. Notify if any new or changing moles/skin lesions, etc. Dexa Scan indications/ risk factors for osteoporotic fractures (and associated M/M) and preventative measures reviewed. Counseled on testicular exams and prostate exams. Colonoscopy recommendations reviewed.   Pt denies change in

## 2023-12-08 DIAGNOSIS — E11.65 TYPE 2 DIABETES MELLITUS WITH HYPERGLYCEMIA, WITH LONG-TERM CURRENT USE OF INSULIN (HCC): ICD-10-CM

## 2023-12-08 DIAGNOSIS — I10 ESSENTIAL HYPERTENSION: ICD-10-CM

## 2023-12-08 DIAGNOSIS — E11.65 TYPE 2 DIABETES MELLITUS WITH HYPERGLYCEMIA (HCC): ICD-10-CM

## 2023-12-08 DIAGNOSIS — Z79.4 TYPE 2 DIABETES MELLITUS WITH HYPERGLYCEMIA, WITH LONG-TERM CURRENT USE OF INSULIN (HCC): ICD-10-CM

## 2023-12-08 RX ORDER — LISINOPRIL 10 MG/1
10 TABLET ORAL DAILY
Qty: 90 TABLET | Refills: 1 | Status: SHIPPED | OUTPATIENT
Start: 2023-12-08

## 2023-12-11 DIAGNOSIS — Z79.4 TYPE 2 DIABETES MELLITUS WITH HYPERGLYCEMIA, WITH LONG-TERM CURRENT USE OF INSULIN (HCC): ICD-10-CM

## 2023-12-11 DIAGNOSIS — E11.65 TYPE 2 DIABETES MELLITUS WITH HYPERGLYCEMIA, WITH LONG-TERM CURRENT USE OF INSULIN (HCC): ICD-10-CM

## 2023-12-11 RX ORDER — PEN NEEDLE, DIABETIC 32GX 5/32"
NEEDLE, DISPOSABLE MISCELLANEOUS
Qty: 100 EACH | Refills: 0 | Status: SHIPPED | OUTPATIENT
Start: 2023-12-11

## 2024-02-19 DIAGNOSIS — E11.9 TYPE 2 DIABETES MELLITUS WITHOUT COMPLICATIONS (HCC): ICD-10-CM

## 2024-02-20 RX ORDER — INSULIN ASPART 100 [IU]/ML
INJECTION, SOLUTION INTRAVENOUS; SUBCUTANEOUS
Qty: 30 ADJUSTABLE DOSE PRE-FILLED PEN SYRINGE | Refills: 1 | OUTPATIENT
Start: 2024-02-20

## 2024-04-04 DIAGNOSIS — E11.9 TYPE 2 DIABETES MELLITUS WITHOUT COMPLICATION, WITH LONG-TERM CURRENT USE OF INSULIN (HCC): ICD-10-CM

## 2024-04-04 DIAGNOSIS — Z79.4 TYPE 2 DIABETES MELLITUS WITHOUT COMPLICATION, WITH LONG-TERM CURRENT USE OF INSULIN (HCC): ICD-10-CM

## 2024-04-08 DIAGNOSIS — E78.2 MIXED HYPERLIPIDEMIA: ICD-10-CM

## 2024-04-08 RX ORDER — OMEGA-3-ACID ETHYL ESTERS 1 G/1
2 CAPSULE, LIQUID FILLED ORAL 2 TIMES DAILY
Qty: 360 CAPSULE | Refills: 1 | Status: SHIPPED | OUTPATIENT
Start: 2024-04-08

## 2024-04-15 DIAGNOSIS — Z79.4 TYPE 2 DIABETES MELLITUS WITH HYPERGLYCEMIA, WITH LONG-TERM CURRENT USE OF INSULIN (HCC): ICD-10-CM

## 2024-04-15 DIAGNOSIS — E78.2 MIXED HYPERLIPIDEMIA: ICD-10-CM

## 2024-04-15 DIAGNOSIS — E11.65 TYPE 2 DIABETES MELLITUS WITH HYPERGLYCEMIA, WITH LONG-TERM CURRENT USE OF INSULIN (HCC): ICD-10-CM

## 2024-04-15 DIAGNOSIS — E55.9 VITAMIN D DEFICIENCY: ICD-10-CM

## 2024-04-15 LAB
ALBUMIN SERPL-MCNC: 4.5 G/DL (ref 3.5–5.2)
ALP BLD-CCNC: 148 U/L (ref 40–129)
ALT SERPL-CCNC: <5 U/L (ref 0–40)
ANION GAP SERPL CALCULATED.3IONS-SCNC: 19 MMOL/L (ref 7–16)
AST SERPL-CCNC: <5 U/L (ref 0–39)
BILIRUB SERPL-MCNC: 0.2 MG/DL (ref 0–1.2)
BILIRUBIN URINE: NEGATIVE
BUN BLDV-MCNC: 16 MG/DL (ref 6–20)
CALCIUM SERPL-MCNC: 10.1 MG/DL (ref 8.6–10.2)
CHLORIDE BLD-SCNC: 96 MMOL/L (ref 98–107)
CHOLESTEROL: 367 MG/DL
CO2: 20 MMOL/L (ref 22–29)
COLOR: YELLOW
CREAT SERPL-MCNC: 0.7 MG/DL (ref 0.7–1.2)
CREATININE URINE: 57.2 MG/DL (ref 40–278)
GFR SERPL CREATININE-BSD FRML MDRD: >90 ML/MIN/1.73M2
GLUCOSE BLD-MCNC: 354 MG/DL (ref 74–99)
GLUCOSE URINE: >=1000 MG/DL
HBA1C MFR BLD: 13.3 % (ref 4–5.6)
HDLC SERPL-MCNC: 22 MG/DL
KETONES, URINE: ABNORMAL MG/DL
LDL CHOLESTEROL: ABNORMAL MG/DL
LEUKOCYTE ESTERASE, URINE: NEGATIVE
MICROALBUMIN/CREAT 24H UR: 295 MG/L (ref 0–19)
MICROALBUMIN/CREAT UR-RTO: 516 MCG/MG CREAT (ref 0–30)
NITRITE, URINE: NEGATIVE
PH UA: 6 (ref 5–9)
POTASSIUM SERPL-SCNC: 5 MMOL/L (ref 3.5–5)
PROTEIN UA: 30 MG/DL
RBC UA: NORMAL /HPF
SODIUM BLD-SCNC: 135 MMOL/L (ref 132–146)
SPECIFIC GRAVITY UA: 1.01 (ref 1–1.03)
TOTAL CK: 47 U/L (ref 20–200)
TOTAL PROTEIN: 7.4 G/DL (ref 6.4–8.3)
TRIGL SERPL-MCNC: 1844 MG/DL
TURBIDITY: CLEAR
URINE HGB: NEGATIVE
UROBILINOGEN, URINE: 0.2 EU/DL (ref 0–1)
VITAMIN D 25-HYDROXY: 23.2 NG/ML (ref 30–100)
VLDLC SERPL CALC-MCNC: ABNORMAL MG/DL
WBC UA: NORMAL /HPF

## 2024-04-15 NOTE — RESULT ENCOUNTER NOTE
Triglycerides extremely high.  Blood sugar very high, hemoglobin A1c very high.  Imperative he see the endocrinologist ASAP.  Stressed low-carb low sugar diet.  Dangerous numbers.  ER if any serious signs/symptoms.  Keep follow-up

## 2024-04-15 NOTE — RESULT ENCOUNTER NOTE
Higher amount of protein in urine which can indicate sugar damage to kidneys.  Important that he is on lisinopril.  Encourage proper hydration, sugar control, avoid \"nephrotoxic agents\" including NSAIDs.  I would encourage him to see nephrology-I can refer again if he is interested as well as endocrinology.  Keep follow-up with me to review in more detail sooner as needed

## 2024-04-16 NOTE — RESULT ENCOUNTER NOTE
If no symptoms, does she want me to refill insulin?  Very dangerous to be without.  I have to know exactly what he is taking and how he is supposed to be using it though as I typically do not manage.  If any serious signs or symptoms including DKA etc. which can present his weakness acetone taste in mouth dehydration nausea vomiting of course ER

## 2024-04-17 DIAGNOSIS — E11.9 TYPE 2 DIABETES MELLITUS WITHOUT COMPLICATION, WITH LONG-TERM CURRENT USE OF INSULIN (HCC): Primary | ICD-10-CM

## 2024-04-17 DIAGNOSIS — Z79.4 TYPE 2 DIABETES MELLITUS WITHOUT COMPLICATION, WITH LONG-TERM CURRENT USE OF INSULIN (HCC): Primary | ICD-10-CM

## 2024-04-17 DIAGNOSIS — E78.2 MIXED HYPERLIPIDEMIA: ICD-10-CM

## 2024-04-17 RX ORDER — ATORVASTATIN CALCIUM 40 MG/1
40 TABLET, FILM COATED ORAL DAILY
Qty: 90 TABLET | Refills: 1 | Status: SHIPPED | OUTPATIENT
Start: 2024-04-17

## 2024-04-17 RX ORDER — BLOOD SUGAR DIAGNOSTIC
1 STRIP MISCELLANEOUS DAILY
Qty: 100 EACH | Refills: 3 | Status: SHIPPED | OUTPATIENT
Start: 2024-04-17

## 2024-04-17 RX ORDER — FENOFIBRIC ACID 135 MG/1
135 CAPSULE, DELAYED RELEASE ORAL DAILY
Qty: 90 CAPSULE | Refills: 1 | Status: SHIPPED | OUTPATIENT
Start: 2024-04-17

## 2024-04-18 DIAGNOSIS — E11.9 TYPE 2 DIABETES MELLITUS WITHOUT COMPLICATIONS (HCC): ICD-10-CM

## 2024-04-18 RX ORDER — INSULIN ASPART 100 [IU]/ML
INJECTION, SOLUTION INTRAVENOUS; SUBCUTANEOUS
Qty: 30 ADJUSTABLE DOSE PRE-FILLED PEN SYRINGE | Refills: 2 | Status: SHIPPED
Start: 2024-04-18 | End: 2024-04-18

## 2024-04-18 RX ORDER — INSULIN LISPRO 100 [IU]/ML
INJECTION, SOLUTION INTRAVENOUS; SUBCUTANEOUS
Qty: 10 ADJUSTABLE DOSE PRE-FILLED PEN SYRINGE | Refills: 3 | Status: SHIPPED | OUTPATIENT
Start: 2024-04-18

## 2024-04-18 NOTE — TELEPHONE ENCOUNTER
Degludec insulin not covered by insurance either. Pt asked for novalog pens to be resent says he will pay cash.

## 2024-04-30 ENCOUNTER — PATIENT MESSAGE (OUTPATIENT)
Dept: PRIMARY CARE CLINIC | Age: 54
End: 2024-04-30

## 2024-04-30 ENCOUNTER — OFFICE VISIT (OUTPATIENT)
Dept: PRIMARY CARE CLINIC | Age: 54
End: 2024-04-30

## 2024-04-30 VITALS
TEMPERATURE: 97.3 F | WEIGHT: 219 LBS | HEART RATE: 73 BPM | SYSTOLIC BLOOD PRESSURE: 132 MMHG | DIASTOLIC BLOOD PRESSURE: 84 MMHG | OXYGEN SATURATION: 96 % | BODY MASS INDEX: 34.37 KG/M2 | HEIGHT: 67 IN | RESPIRATION RATE: 18 BRPM

## 2024-04-30 DIAGNOSIS — N52.9 ERECTILE DYSFUNCTION, UNSPECIFIED ERECTILE DYSFUNCTION TYPE: ICD-10-CM

## 2024-04-30 DIAGNOSIS — E55.9 VITAMIN D DEFICIENCY: ICD-10-CM

## 2024-04-30 DIAGNOSIS — E11.65 TYPE 2 DIABETES MELLITUS WITH HYPERGLYCEMIA, WITH LONG-TERM CURRENT USE OF INSULIN (HCC): ICD-10-CM

## 2024-04-30 DIAGNOSIS — I10 ESSENTIAL HYPERTENSION: ICD-10-CM

## 2024-04-30 DIAGNOSIS — Z00.00 HEALTH CARE MAINTENANCE: ICD-10-CM

## 2024-04-30 DIAGNOSIS — E78.2 MIXED HYPERLIPIDEMIA: Primary | ICD-10-CM

## 2024-04-30 DIAGNOSIS — Z79.4 TYPE 2 DIABETES MELLITUS WITH HYPERGLYCEMIA, WITH LONG-TERM CURRENT USE OF INSULIN (HCC): ICD-10-CM

## 2024-04-30 DIAGNOSIS — R80.9 PROTEINURIA, UNSPECIFIED TYPE: ICD-10-CM

## 2024-04-30 RX ORDER — TADALAFIL 10 MG/1
10 TABLET ORAL DAILY PRN
Qty: 10 TABLET | Refills: 1 | Status: SHIPPED | OUTPATIENT
Start: 2024-04-30

## 2024-04-30 ASSESSMENT — PATIENT HEALTH QUESTIONNAIRE - PHQ9
SUM OF ALL RESPONSES TO PHQ QUESTIONS 1-9: 0
2. FEELING DOWN, DEPRESSED OR HOPELESS: NOT AT ALL
SUM OF ALL RESPONSES TO PHQ9 QUESTIONS 1 & 2: 0
SUM OF ALL RESPONSES TO PHQ QUESTIONS 1-9: 0
1. LITTLE INTEREST OR PLEASURE IN DOING THINGS: NOT AT ALL

## 2024-04-30 NOTE — PROGRESS NOTES
Carlitos Das : 1970 Sex: male  Age: 54 y.o.    Chief Complaint   Patient presents with    Cholesterol Problem       HPI  HPI:    Patient presents for follow-up on blood work.  Unfortunately had run out of insulin again, sugars are quite elevated and A1c very elevated.  He was under the impression Dr Vegas would not send refills until appointment which was not till September but when he sent Dr Vegas numbers insulin was refilled  Avg on meter now that on insulin went from 325 to 190  Working with dr vegas.  If sugars not controlled he should contact him    Risks of uncontrolled conditions reviewed with risk of sudden DF events reviewed.  Offered cardiology consult to be safe, not interested.  Is having erectile dysfunction, Cialis worked the best but tried sildenafil instead because of cost, not working as well.  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  States was having marital issues but this is improved    Labs quite uncontrolled with a hemoglobin A1c of 13.3 and a triglyceride level of 1800, LFTs and accurate because of lipids, alk phos 148 HDL 22 glucose 354 chloride 96 CO2 20 anion gap 19 vitamin D 23 and he is going to start vitamin D3 2000 IUs daily urinalysis shows glucose trace ketones 516 microalbumin creatinine ratio, on lisinopril, sugar control of size, no DKA symptoms      Most Recent Labs  CBC  Lab Results   Component Value Date/Time    WBC 7.4 09/15/2023 10:24 AM    WBC 4.9 2022 07:16 AM    WBC 5.0 2022 08:32 AM    RBC 4.94 09/15/2023 10:24 AM    RBC 4.81 2022 07:16 AM    RBC 5.11 2022 08:32 AM    HGB 14.4 09/15/2023 10:24 AM    HGB 14.0 2022 07:16 AM    HGB 15.0 2022 08:32 AM    HCT 43.8 09/15/2023 10:24 AM    HCT 42.5 2022 07:16 AM    HCT 46.1 2022 08:32 AM    MCV 88.7 09/15/2023 10:24 AM    MCV 88.4

## 2024-05-13 DIAGNOSIS — E78.2 MIXED HYPERLIPIDEMIA: ICD-10-CM

## 2024-05-13 LAB
ALBUMIN: 4.5 G/DL (ref 3.5–5.2)
ALP BLD-CCNC: 69 U/L (ref 40–129)
ALT SERPL-CCNC: 23 U/L (ref 0–40)
ANION GAP SERPL CALCULATED.3IONS-SCNC: 14 MMOL/L (ref 7–16)
AST SERPL-CCNC: 20 U/L (ref 0–39)
BILIRUB SERPL-MCNC: 0.2 MG/DL (ref 0–1.2)
BUN BLDV-MCNC: 13 MG/DL (ref 6–20)
CALCIUM SERPL-MCNC: 9.9 MG/DL (ref 8.6–10.2)
CHLORIDE BLD-SCNC: 104 MMOL/L (ref 98–107)
CHOLESTEROL, TOTAL: 118 MG/DL
CO2: 22 MMOL/L (ref 22–29)
CREAT SERPL-MCNC: 0.8 MG/DL (ref 0.7–1.2)
GFR, ESTIMATED: >90 ML/MIN/1.73M2
GLUCOSE BLD-MCNC: 103 MG/DL (ref 74–99)
HDLC SERPL-MCNC: 30 MG/DL
LDL CHOLESTEROL: 43 MG/DL
POTASSIUM SERPL-SCNC: 4.7 MMOL/L (ref 3.5–5)
SODIUM BLD-SCNC: 140 MMOL/L (ref 132–146)
TOTAL PROTEIN: 7.2 G/DL (ref 6.4–8.3)
TRIGL SERPL-MCNC: 223 MG/DL
VLDLC SERPL CALC-MCNC: 45 MG/DL

## 2024-05-13 NOTE — RESULT ENCOUNTER NOTE
Advised patient to keep FU to review in more detail.  [General Appearance - In No Acute Distress] : no acute distress [Skin Color & Pigmentation] : normal skin color and pigmentation [] : no respiratory distress [Oriented To Time, Place, And Person] : oriented to person, place, and time [Normal Station and Gait] : the gait and station were normal for the patient's age

## 2024-05-14 ENCOUNTER — OFFICE VISIT (OUTPATIENT)
Dept: PRIMARY CARE CLINIC | Age: 54
End: 2024-05-14
Payer: COMMERCIAL

## 2024-05-14 VITALS
BODY MASS INDEX: 34.37 KG/M2 | HEART RATE: 87 BPM | TEMPERATURE: 98 F | OXYGEN SATURATION: 97 % | DIASTOLIC BLOOD PRESSURE: 86 MMHG | SYSTOLIC BLOOD PRESSURE: 136 MMHG | HEIGHT: 67 IN | WEIGHT: 219 LBS | RESPIRATION RATE: 20 BRPM

## 2024-05-14 DIAGNOSIS — E11.65 TYPE 2 DIABETES MELLITUS WITH HYPERGLYCEMIA, WITH LONG-TERM CURRENT USE OF INSULIN (HCC): ICD-10-CM

## 2024-05-14 DIAGNOSIS — R80.9 PROTEINURIA, UNSPECIFIED TYPE: ICD-10-CM

## 2024-05-14 DIAGNOSIS — Z79.4 TYPE 2 DIABETES MELLITUS WITH HYPERGLYCEMIA, WITH LONG-TERM CURRENT USE OF INSULIN (HCC): ICD-10-CM

## 2024-05-14 DIAGNOSIS — I10 ESSENTIAL HYPERTENSION: ICD-10-CM

## 2024-05-14 DIAGNOSIS — E78.2 MIXED HYPERLIPIDEMIA: Primary | ICD-10-CM

## 2024-05-14 DIAGNOSIS — Z00.00 HEALTH CARE MAINTENANCE: ICD-10-CM

## 2024-05-14 DIAGNOSIS — E55.9 VITAMIN D DEFICIENCY: ICD-10-CM

## 2024-05-14 PROCEDURE — 3079F DIAST BP 80-89 MM HG: CPT | Performed by: FAMILY MEDICINE

## 2024-05-14 PROCEDURE — 3046F HEMOGLOBIN A1C LEVEL >9.0%: CPT | Performed by: FAMILY MEDICINE

## 2024-05-14 PROCEDURE — 99214 OFFICE O/P EST MOD 30 MIN: CPT | Performed by: FAMILY MEDICINE

## 2024-05-14 PROCEDURE — 3075F SYST BP GE 130 - 139MM HG: CPT | Performed by: FAMILY MEDICINE

## 2024-05-14 NOTE — PROGRESS NOTES
Carlitos Das : 1970 Sex: male  Age: 54 y.o.    Chief Complaint   Patient presents with    Results       HPI  HPI:    Presents for follow-up.  Trying to control his diet.  Endocrinologist called in insulin for him.  He will follow-up with them.  Taking his medication    Taking his medication labs are significantly, fasting glucose 103, caution to watch for hypoglycemia which she has not had any HDL up to 30 LDL 43 triglycerides down to 223.      Risks of uncontrolled conditions reviewed with risk of sudden DF events reviewed.  Offered cardiology consult to be safe, not interested in this, vascular imaging or otherwise      Most Recent Labs  CBC  Lab Results   Component Value Date/Time    WBC 7.4 09/15/2023 10:24 AM    WBC 4.9 2022 07:16 AM    WBC 5.0 2022 08:32 AM    RBC 4.94 09/15/2023 10:24 AM    RBC 4.81 2022 07:16 AM    RBC 5.11 2022 08:32 AM    HGB 14.4 09/15/2023 10:24 AM    HGB 14.0 2022 07:16 AM    HGB 15.0 2022 08:32 AM    HCT 43.8 09/15/2023 10:24 AM    HCT 42.5 2022 07:16 AM    HCT 46.1 2022 08:32 AM    MCV 88.7 09/15/2023 10:24 AM    MCV 88.4 2022 07:16 AM    MCV 90.2 2022 08:32 AM     09/15/2023 10:24 AM     2022 07:16 AM     2022 08:32 AM      CMP  Lab Results   Component Value Date/Time     2024 08:04 AM     04/15/2024 08:44 AM     09/15/2023 10:24 AM    K 4.7 2024 08:04 AM    K 5.0 04/15/2024 08:44 AM    K 4.9 09/15/2023 10:24 AM     2024 08:04 AM    CL 96 04/15/2024 08:44 AM     09/15/2023 10:24 AM    CO2 22 2024 08:04 AM    CO2 20 04/15/2024 08:44 AM    CO2 20 09/15/2023 10:24 AM    ANIONGAP 14 2024 08:04 AM    ANIONGAP 19 04/15/2024 08:44 AM    ANIONGAP 16 09/15/2023 10:24 AM    GLUCOSE 103 2024 08:04 AM    GLUCOSE 354 04/15/2024 08:44 AM    GLUCOSE 190 09/15/2023 10:24 AM    BUN 13 2024 08:04 AM    BUN 16

## 2024-06-10 DIAGNOSIS — Z79.4 TYPE 2 DIABETES MELLITUS WITH HYPERGLYCEMIA, WITH LONG-TERM CURRENT USE OF INSULIN (HCC): ICD-10-CM

## 2024-06-10 DIAGNOSIS — N52.9 ERECTILE DYSFUNCTION, UNSPECIFIED ERECTILE DYSFUNCTION TYPE: ICD-10-CM

## 2024-06-10 DIAGNOSIS — E78.2 MIXED HYPERLIPIDEMIA: ICD-10-CM

## 2024-06-10 DIAGNOSIS — E11.65 TYPE 2 DIABETES MELLITUS WITH HYPERGLYCEMIA, WITH LONG-TERM CURRENT USE OF INSULIN (HCC): ICD-10-CM

## 2024-06-10 DIAGNOSIS — I10 ESSENTIAL HYPERTENSION: ICD-10-CM

## 2024-06-10 RX ORDER — TADALAFIL 10 MG/1
10 TABLET ORAL DAILY PRN
Qty: 10 TABLET | Refills: 1 | Status: SHIPPED | OUTPATIENT
Start: 2024-06-10

## 2024-06-10 RX ORDER — ATORVASTATIN CALCIUM 40 MG/1
40 TABLET, FILM COATED ORAL DAILY
Qty: 90 TABLET | Refills: 1 | Status: SHIPPED | OUTPATIENT
Start: 2024-06-10

## 2024-06-10 RX ORDER — LISINOPRIL 10 MG/1
10 TABLET ORAL DAILY
Qty: 90 TABLET | Refills: 1 | Status: SHIPPED | OUTPATIENT
Start: 2024-06-10

## 2024-06-10 RX ORDER — FENOFIBRIC ACID 135 MG/1
135 CAPSULE, DELAYED RELEASE ORAL DAILY
Qty: 90 CAPSULE | Refills: 1 | Status: SHIPPED | OUTPATIENT
Start: 2024-06-10

## 2024-06-13 ENCOUNTER — TELEPHONE (OUTPATIENT)
Dept: ENDOCRINOLOGY | Age: 54
End: 2024-06-13

## 2024-06-14 ENCOUNTER — TELEPHONE (OUTPATIENT)
Dept: ENDOCRINOLOGY | Age: 54
End: 2024-06-14

## 2024-06-17 ENCOUNTER — TELEPHONE (OUTPATIENT)
Dept: ENDOCRINOLOGY | Age: 54
End: 2024-06-17

## 2024-06-17 NOTE — TELEPHONE ENCOUNTER
See attached blood sugar log    Humalog 20 units w/ meals + SS  Metformin 1000mg 1 tab 2x daily  Tresiba 30 unit nightly

## 2024-06-20 ENCOUNTER — PATIENT MESSAGE (OUTPATIENT)
Dept: PRIMARY CARE CLINIC | Age: 54
End: 2024-06-20

## 2024-06-20 NOTE — TELEPHONE ENCOUNTER
From: Carlitos Das  To: Dr. Rd Bose  Sent: 6/20/2024 11:14 AM EDT  Subject: A1C     I am applying for a job and have taken a pre-employment physical. The April A1C numbers are an issue.     Any chance that Dr. Bose can schedule blood work, A1C test now instead of waiting for labs in July so that I can get this job?

## 2024-06-21 DIAGNOSIS — I10 ESSENTIAL HYPERTENSION: ICD-10-CM

## 2024-06-21 DIAGNOSIS — E11.65 TYPE 2 DIABETES MELLITUS WITH HYPERGLYCEMIA, WITH LONG-TERM CURRENT USE OF INSULIN (HCC): ICD-10-CM

## 2024-06-21 DIAGNOSIS — E55.9 VITAMIN D DEFICIENCY: ICD-10-CM

## 2024-06-21 DIAGNOSIS — Z79.4 TYPE 2 DIABETES MELLITUS WITH HYPERGLYCEMIA, WITH LONG-TERM CURRENT USE OF INSULIN (HCC): ICD-10-CM

## 2024-06-21 DIAGNOSIS — E78.2 MIXED HYPERLIPIDEMIA: ICD-10-CM

## 2024-06-21 LAB
ALBUMIN: 4.6 G/DL (ref 3.5–5.2)
ALP BLD-CCNC: 62 U/L (ref 40–129)
ALT SERPL-CCNC: 22 U/L (ref 0–40)
ANION GAP SERPL CALCULATED.3IONS-SCNC: 13 MMOL/L (ref 7–16)
AST SERPL-CCNC: 17 U/L (ref 0–39)
BASOPHILS ABSOLUTE: 0.06 K/UL (ref 0–0.2)
BASOPHILS RELATIVE PERCENT: 1 % (ref 0–2)
BILIRUB SERPL-MCNC: 0.2 MG/DL (ref 0–1.2)
BILIRUBIN, URINE: NEGATIVE
BUN BLDV-MCNC: 18 MG/DL (ref 6–20)
CALCIUM SERPL-MCNC: 10 MG/DL (ref 8.6–10.2)
CHLORIDE BLD-SCNC: 104 MMOL/L (ref 98–107)
CHOLESTEROL, TOTAL: 125 MG/DL
CO2: 21 MMOL/L (ref 22–29)
COLOR: YELLOW
COMMENT: ABNORMAL
CREAT SERPL-MCNC: 0.8 MG/DL (ref 0.7–1.2)
CREATININE URINE: 15.7 MG/DL (ref 40–278)
EOSINOPHILS ABSOLUTE: 0.14 K/UL (ref 0.05–0.5)
EOSINOPHILS RELATIVE PERCENT: 3 % (ref 0–6)
GFR, ESTIMATED: >90 ML/MIN/1.73M2
GLUCOSE BLD-MCNC: 86 MG/DL (ref 74–99)
GLUCOSE URINE: NEGATIVE MG/DL
HBA1C MFR BLD: 9.1 % (ref 4–5.6)
HCT VFR BLD CALC: 41.6 % (ref 37–54)
HDLC SERPL-MCNC: 36 MG/DL
HEMOGLOBIN: 13.6 G/DL (ref 12.5–16.5)
IMMATURE GRANULOCYTES %: 0 % (ref 0–5)
IMMATURE GRANULOCYTES ABSOLUTE: <0.03 K/UL (ref 0–0.58)
KETONES, URINE: NEGATIVE MG/DL
LDL CHOLESTEROL: 60 MG/DL
LEUKOCYTE ESTERASE, URINE: NEGATIVE
LYMPHOCYTES ABSOLUTE: 2.19 K/UL (ref 1.5–4)
LYMPHOCYTES RELATIVE PERCENT: 40 % (ref 20–42)
MCH RBC QN AUTO: 28.6 PG (ref 26–35)
MCHC RBC AUTO-ENTMCNC: 32.7 G/DL (ref 32–34.5)
MCV RBC AUTO: 87.6 FL (ref 80–99.9)
MICROALBUMIN/CREAT 24H UR: <12 MG/L (ref 0–19)
MICROALBUMIN/CREAT UR-RTO: ABNORMAL MCG/MG CREAT (ref 0–30)
MONOCYTES ABSOLUTE: 0.4 K/UL (ref 0.1–0.95)
MONOCYTES RELATIVE PERCENT: 7 % (ref 2–12)
NEUTROPHILS ABSOLUTE: 2.7 K/UL (ref 1.8–7.3)
NEUTROPHILS RELATIVE PERCENT: 49 % (ref 43–80)
NITRITE, URINE: NEGATIVE
PDW BLD-RTO: 13.1 % (ref 11.5–15)
PH, URINE: 6 (ref 5–9)
PLATELET # BLD: 242 K/UL (ref 130–450)
PMV BLD AUTO: 10.8 FL (ref 7–12)
POTASSIUM SERPL-SCNC: 4.6 MMOL/L (ref 3.5–5)
PROTEIN UA: NEGATIVE MG/DL
RBC # BLD: 4.75 M/UL (ref 3.8–5.8)
SODIUM BLD-SCNC: 138 MMOL/L (ref 132–146)
SPECIFIC GRAVITY UA: <1.005 (ref 1–1.03)
TOTAL CK: 135 U/L (ref 20–200)
TOTAL PROTEIN: 7.8 G/DL (ref 6.4–8.3)
TRIGL SERPL-MCNC: 147 MG/DL
TSH SERPL DL<=0.05 MIU/L-ACNC: 2.41 UIU/ML (ref 0.27–4.2)
TURBIDITY: CLEAR
URINE HGB: NEGATIVE
UROBILINOGEN, URINE: 0.2 EU/DL (ref 0–1)
VITAMIN D 25-HYDROXY: 37.2 NG/ML (ref 30–100)
VLDLC SERPL CALC-MCNC: 29 MG/DL
WBC # BLD: 5.5 K/UL (ref 4.5–11.5)

## 2024-06-27 ENCOUNTER — PATIENT MESSAGE (OUTPATIENT)
Dept: PRIMARY CARE CLINIC | Age: 54
End: 2024-06-27

## 2024-07-12 DIAGNOSIS — E11.65 TYPE 2 DIABETES MELLITUS WITH HYPERGLYCEMIA, WITH LONG-TERM CURRENT USE OF INSULIN (HCC): ICD-10-CM

## 2024-07-12 DIAGNOSIS — Z79.4 TYPE 2 DIABETES MELLITUS WITH HYPERGLYCEMIA, WITH LONG-TERM CURRENT USE OF INSULIN (HCC): ICD-10-CM

## 2024-07-12 LAB
ALBUMIN: 4.5 G/DL (ref 3.5–5.2)
ALP BLD-CCNC: 53 U/L (ref 40–129)
ALT SERPL-CCNC: 25 U/L (ref 0–40)
ANION GAP SERPL CALCULATED.3IONS-SCNC: 12 MMOL/L (ref 7–16)
AST SERPL-CCNC: 21 U/L (ref 0–39)
BASOPHILS ABSOLUTE: 0.06 K/UL (ref 0–0.2)
BASOPHILS RELATIVE PERCENT: 1 % (ref 0–2)
BILIRUB SERPL-MCNC: 0.3 MG/DL (ref 0–1.2)
BUN BLDV-MCNC: 22 MG/DL (ref 6–20)
CALCIUM SERPL-MCNC: 9.9 MG/DL (ref 8.6–10.2)
CHLORIDE BLD-SCNC: 101 MMOL/L (ref 98–107)
CO2: 24 MMOL/L (ref 22–29)
CREAT SERPL-MCNC: 0.8 MG/DL (ref 0.7–1.2)
EOSINOPHILS ABSOLUTE: 0.19 K/UL (ref 0.05–0.5)
EOSINOPHILS RELATIVE PERCENT: 4 % (ref 0–6)
GFR, ESTIMATED: >90 ML/MIN/1.73M2
GLUCOSE BLD-MCNC: 127 MG/DL (ref 74–99)
HCT VFR BLD CALC: 44 % (ref 37–54)
HEMOGLOBIN: 14.3 G/DL (ref 12.5–16.5)
IMMATURE GRANULOCYTES %: 0 % (ref 0–5)
IMMATURE GRANULOCYTES ABSOLUTE: <0.03 K/UL (ref 0–0.58)
LYMPHOCYTES ABSOLUTE: 2.42 K/UL (ref 1.5–4)
LYMPHOCYTES RELATIVE PERCENT: 47 % (ref 20–42)
MCH RBC QN AUTO: 28.7 PG (ref 26–35)
MCHC RBC AUTO-ENTMCNC: 32.5 G/DL (ref 32–34.5)
MCV RBC AUTO: 88.4 FL (ref 80–99.9)
MONOCYTES ABSOLUTE: 0.3 K/UL (ref 0.1–0.95)
MONOCYTES RELATIVE PERCENT: 6 % (ref 2–12)
NEUTROPHILS ABSOLUTE: 2.16 K/UL (ref 1.8–7.3)
NEUTROPHILS RELATIVE PERCENT: 42 % (ref 43–80)
PDW BLD-RTO: 13.3 % (ref 11.5–15)
PLATELET # BLD: 249 K/UL (ref 130–450)
PMV BLD AUTO: 11.1 FL (ref 7–12)
POTASSIUM SERPL-SCNC: 5 MMOL/L (ref 3.5–5)
RBC # BLD: 4.98 M/UL (ref 3.8–5.8)
SODIUM BLD-SCNC: 137 MMOL/L (ref 132–146)
TOTAL PROTEIN: 7.9 G/DL (ref 6.4–8.3)
WBC # BLD: 5.1 K/UL (ref 4.5–11.5)

## 2024-07-15 ENCOUNTER — OFFICE VISIT (OUTPATIENT)
Dept: PRIMARY CARE CLINIC | Age: 54
End: 2024-07-15
Payer: COMMERCIAL

## 2024-07-15 VITALS
WEIGHT: 229 LBS | HEART RATE: 91 BPM | SYSTOLIC BLOOD PRESSURE: 138 MMHG | OXYGEN SATURATION: 98 % | BODY MASS INDEX: 35.94 KG/M2 | HEIGHT: 67 IN | DIASTOLIC BLOOD PRESSURE: 82 MMHG | TEMPERATURE: 97.4 F

## 2024-07-15 DIAGNOSIS — E55.9 VITAMIN D DEFICIENCY: ICD-10-CM

## 2024-07-15 DIAGNOSIS — I10 ESSENTIAL HYPERTENSION: ICD-10-CM

## 2024-07-15 DIAGNOSIS — E78.2 MIXED HYPERLIPIDEMIA: ICD-10-CM

## 2024-07-15 DIAGNOSIS — E53.8 VITAMIN B12 DEFICIENCY: ICD-10-CM

## 2024-07-15 DIAGNOSIS — Z79.4 TYPE 2 DIABETES MELLITUS WITH HYPERGLYCEMIA, WITH LONG-TERM CURRENT USE OF INSULIN (HCC): Primary | ICD-10-CM

## 2024-07-15 DIAGNOSIS — E11.65 TYPE 2 DIABETES MELLITUS WITH HYPERGLYCEMIA, WITH LONG-TERM CURRENT USE OF INSULIN (HCC): Primary | ICD-10-CM

## 2024-07-15 LAB
CHP ED QC CHECK: NORMAL
GLUCOSE BLD-MCNC: 205 MG/DL
HBA1C MFR BLD: 7.6 %

## 2024-07-15 PROCEDURE — 3051F HG A1C>EQUAL 7.0%<8.0%: CPT | Performed by: FAMILY MEDICINE

## 2024-07-15 PROCEDURE — 3075F SYST BP GE 130 - 139MM HG: CPT | Performed by: FAMILY MEDICINE

## 2024-07-15 PROCEDURE — 83036 HEMOGLOBIN GLYCOSYLATED A1C: CPT | Performed by: FAMILY MEDICINE

## 2024-07-15 PROCEDURE — 3079F DIAST BP 80-89 MM HG: CPT | Performed by: FAMILY MEDICINE

## 2024-07-15 PROCEDURE — 82962 GLUCOSE BLOOD TEST: CPT | Performed by: FAMILY MEDICINE

## 2024-07-15 PROCEDURE — 99214 OFFICE O/P EST MOD 30 MIN: CPT | Performed by: FAMILY MEDICINE

## 2024-07-15 RX ORDER — FENOFIBRIC ACID 135 MG/1
135 CAPSULE, DELAYED RELEASE ORAL DAILY
Qty: 90 CAPSULE | Refills: 1 | Status: SHIPPED | OUTPATIENT
Start: 2024-07-15

## 2024-07-15 RX ORDER — LISINOPRIL 10 MG/1
10 TABLET ORAL DAILY
Qty: 90 TABLET | Refills: 1 | Status: SHIPPED | OUTPATIENT
Start: 2024-07-15

## 2024-07-15 RX ORDER — OMEGA-3-ACID ETHYL ESTERS 1 G/1
2 CAPSULE, LIQUID FILLED ORAL 2 TIMES DAILY
Qty: 360 CAPSULE | Refills: 1 | Status: SHIPPED | OUTPATIENT
Start: 2024-07-15

## 2024-07-15 RX ORDER — ATORVASTATIN CALCIUM 40 MG/1
40 TABLET, FILM COATED ORAL DAILY
Qty: 90 TABLET | Refills: 1 | Status: SHIPPED | OUTPATIENT
Start: 2024-07-15

## 2024-07-15 SDOH — ECONOMIC STABILITY: FOOD INSECURITY: WITHIN THE PAST 12 MONTHS, THE FOOD YOU BOUGHT JUST DIDN'T LAST AND YOU DIDN'T HAVE MONEY TO GET MORE.: NEVER TRUE

## 2024-07-15 SDOH — ECONOMIC STABILITY: FOOD INSECURITY: WITHIN THE PAST 12 MONTHS, YOU WORRIED THAT YOUR FOOD WOULD RUN OUT BEFORE YOU GOT MONEY TO BUY MORE.: NEVER TRUE

## 2024-07-15 SDOH — ECONOMIC STABILITY: INCOME INSECURITY: HOW HARD IS IT FOR YOU TO PAY FOR THE VERY BASICS LIKE FOOD, HOUSING, MEDICAL CARE, AND HEATING?: NOT HARD AT ALL

## 2024-07-15 SDOH — ECONOMIC STABILITY: HOUSING INSECURITY
IN THE LAST 12 MONTHS, WAS THERE A TIME WHEN YOU DID NOT HAVE A STEADY PLACE TO SLEEP OR SLEPT IN A SHELTER (INCLUDING NOW)?: NO

## 2024-07-15 NOTE — PROGRESS NOTES
instructions on his/her After Visit Summary and given to patient at the end of visit.       After discussion, patient and/or guardian verbalizes understanding, agrees, feels comfortable with and wishes to proceed with above treatment plan. Call for any pending results, FU sooner if abnormal, as needed or if any current symptoms persist/worsen.      Advised patient to call with any new medication issues, and read all Rx info from pharmacy to assure aware of all possible risks and side effects of medication before taking.     Reviewed age and gender appropriate health screening exams and vaccinations.  Advised patient regarding importance of keeping up with recommended health maintenance and to schedule as soon as possible if overdue, as this is important in assessing for undiagnosed pathology, especially cancer, as well as protecting against potentially harmful/life threatening disease.          Patient and/or guardian verbalizes understanding and agrees with above counseling, assessment and plan.     All questions answered.        Signs and symptoms to watch for discussed, serious signs and symptoms reviewed.  ER if any.                Rd Bose MD    Patients are advised to check with insurance company to ensure coverage and to fully understand benefits and cost prior to any testing.  This note was created with the assistance of voice recognition software.  Document was reviewed however may contain grammatical errors.This note or partial portions of this note may have been created using a copy forward or copy paste feature but these portions have been verified and re-edited for accuracy and any portions not in need of editing or reviews are not being used to generate any component necessary for billing purposes.  Some portions may be carried over for continuity purposes and to aid me with monitoring of past medical conditions and discussions.  Elements necessary for proper CPT code selection are based only on

## 2024-08-18 DIAGNOSIS — E11.9 TYPE 2 DIABETES MELLITUS WITHOUT COMPLICATIONS (HCC): ICD-10-CM

## 2024-08-19 RX ORDER — INSULIN LISPRO 100 [IU]/ML
INJECTION, SOLUTION INTRAVENOUS; SUBCUTANEOUS
Qty: 27 ML | Refills: 0 | Status: SHIPPED | OUTPATIENT
Start: 2024-08-19

## 2024-09-05 ENCOUNTER — OFFICE VISIT (OUTPATIENT)
Dept: ENDOCRINOLOGY | Age: 54
End: 2024-09-05
Payer: COMMERCIAL

## 2024-09-05 VITALS
DIASTOLIC BLOOD PRESSURE: 86 MMHG | OXYGEN SATURATION: 99 % | SYSTOLIC BLOOD PRESSURE: 144 MMHG | BODY MASS INDEX: 35 KG/M2 | HEIGHT: 67 IN | WEIGHT: 223 LBS | HEART RATE: 84 BPM

## 2024-09-05 DIAGNOSIS — Z79.4 TYPE 2 DIABETES MELLITUS WITH MICROALBUMINURIA, WITH LONG-TERM CURRENT USE OF INSULIN (HCC): ICD-10-CM

## 2024-09-05 DIAGNOSIS — E11.9 TYPE 2 DIABETES MELLITUS WITHOUT COMPLICATIONS (HCC): ICD-10-CM

## 2024-09-05 DIAGNOSIS — E78.1 HYPERTRIGLYCERIDEMIA: ICD-10-CM

## 2024-09-05 DIAGNOSIS — R80.9 TYPE 2 DIABETES MELLITUS WITH MICROALBUMINURIA, WITH LONG-TERM CURRENT USE OF INSULIN (HCC): ICD-10-CM

## 2024-09-05 DIAGNOSIS — E11.29 TYPE 2 DIABETES MELLITUS WITH MICROALBUMINURIA, WITH LONG-TERM CURRENT USE OF INSULIN (HCC): ICD-10-CM

## 2024-09-05 DIAGNOSIS — E55.9 VITAMIN D DEFICIENCY: ICD-10-CM

## 2024-09-05 DIAGNOSIS — E11.65 TYPE 2 DIABETES MELLITUS WITH HYPERGLYCEMIA, WITH LONG-TERM CURRENT USE OF INSULIN (HCC): Primary | ICD-10-CM

## 2024-09-05 DIAGNOSIS — Z91.119 DIETARY NONCOMPLIANCE: ICD-10-CM

## 2024-09-05 DIAGNOSIS — Z79.4 TYPE 2 DIABETES MELLITUS WITH HYPERGLYCEMIA, WITH LONG-TERM CURRENT USE OF INSULIN (HCC): Primary | ICD-10-CM

## 2024-09-05 PROCEDURE — 3077F SYST BP >= 140 MM HG: CPT | Performed by: CLINICAL NURSE SPECIALIST

## 2024-09-05 PROCEDURE — 3079F DIAST BP 80-89 MM HG: CPT | Performed by: CLINICAL NURSE SPECIALIST

## 2024-09-05 PROCEDURE — 99214 OFFICE O/P EST MOD 30 MIN: CPT | Performed by: CLINICAL NURSE SPECIALIST

## 2024-09-05 PROCEDURE — 3051F HG A1C>EQUAL 7.0%<8.0%: CPT | Performed by: CLINICAL NURSE SPECIALIST

## 2024-09-05 RX ORDER — SEMAGLUTIDE 0.68 MG/ML
INJECTION, SOLUTION SUBCUTANEOUS
Qty: 9 ML | Refills: 1 | Status: SHIPPED | OUTPATIENT
Start: 2024-09-05

## 2024-09-05 RX ORDER — BLOOD-GLUCOSE SENSOR
EACH MISCELLANEOUS
Qty: 6 EACH | Refills: 1 | Status: SHIPPED | OUTPATIENT
Start: 2024-09-05

## 2024-09-05 RX ORDER — INSULIN LISPRO 100 [IU]/ML
INJECTION, SOLUTION INTRAVENOUS; SUBCUTANEOUS
Qty: 27 ML | Refills: 0
Start: 2024-09-05

## 2024-09-05 NOTE — PROGRESS NOTES
[facility]  [facility] Department of Endocrinology Diabetes and Metabolism   [address], [place], [place], [address]   Phone: [phone]  Fax: [phone]    Date of Service: 9/5/2024    Primary Care Physician: Rd Bose MD  Provider: ALISHA Overton - CNS      Reason for the visit:  DM type 2     History of Present Illness:  The history is provided by the patient. No  was used. Accuracy of the patient data is excellent.  Carlitos Das is a very pleasant 54 y.o. male seen today for diabetes management     Carlitos Das was diagnosed with diabetes at age 40 and he is currently on Tresiba 34 units daily at bedtime, Humalog  20 units with meals + ss 3:50>150, Metformin 1000 mg BID  Attempted invokana but caused mycotic infection   The patient has been checking blood sugar 3-4 times per day   Pt reported being very busy at work and for this reason he wasn't strictly following DM diet recently     Lab Results   Component Value Date/Time    LABA1C 7.6 07/15/2024 09:42 AM    LABA1C 9.1 06/21/2024 08:46 AM    LABA1C 13.3 04/15/2024 08:44 AM    LABA1C 11.9 09/15/2023 10:24 AM     Patient has had no hypoglycemic episodes   Since last OV, the patient has been mindful of what has been eating and trying to follow diabetes diet as encouraged   I reviewed current medications and the patient has no issues with diabetes medications  Carlitos Das is up to date with eye exam and denied any history of diabetic retinopathy   The patient performs  own feet care  Microvascular complications:  No Retinopathy, + Nephropathy or Neuropathy   Macrovascular complications: no CAD, PVD, or Stroke  The patient refuses Flushot and pneumonia vaccine   No HX of pancreatitis  No Hx of MTC  No HX of gastroparesis   No HX of UTI/Mycotic infection       PAST MEDICAL HISTORY   Past Medical History:   Diagnosis Date    Encounter for screening colonoscopy 4/22/2019

## 2024-09-09 RX ORDER — INSULIN LISPRO 100 [IU]/ML
INJECTION, SOLUTION INTRAVENOUS; SUBCUTANEOUS
Qty: 30 ADJUSTABLE DOSE PRE-FILLED PEN SYRINGE | Refills: 1 | Status: SHIPPED | OUTPATIENT
Start: 2024-09-09

## 2024-09-16 DIAGNOSIS — Z79.4 TYPE 2 DIABETES MELLITUS WITHOUT COMPLICATION, WITH LONG-TERM CURRENT USE OF INSULIN (HCC): Primary | ICD-10-CM

## 2024-09-16 DIAGNOSIS — E11.9 TYPE 2 DIABETES MELLITUS WITHOUT COMPLICATION, WITH LONG-TERM CURRENT USE OF INSULIN (HCC): Primary | ICD-10-CM

## 2024-09-16 RX ORDER — SEMAGLUTIDE 0.68 MG/ML
INJECTION, SOLUTION SUBCUTANEOUS
Qty: 9 ML | Refills: 1 | Status: SHIPPED | OUTPATIENT
Start: 2024-09-16

## 2024-10-09 DIAGNOSIS — E11.65 TYPE 2 DIABETES MELLITUS WITH HYPERGLYCEMIA, WITH LONG-TERM CURRENT USE OF INSULIN (HCC): ICD-10-CM

## 2024-10-09 DIAGNOSIS — E78.2 MIXED HYPERLIPIDEMIA: ICD-10-CM

## 2024-10-09 DIAGNOSIS — E55.9 VITAMIN D DEFICIENCY: ICD-10-CM

## 2024-10-09 DIAGNOSIS — Z79.4 TYPE 2 DIABETES MELLITUS WITH HYPERGLYCEMIA, WITH LONG-TERM CURRENT USE OF INSULIN (HCC): ICD-10-CM

## 2024-10-09 DIAGNOSIS — I10 ESSENTIAL HYPERTENSION: ICD-10-CM

## 2024-10-09 DIAGNOSIS — E53.8 VITAMIN B12 DEFICIENCY: ICD-10-CM

## 2024-10-09 LAB
ALBUMIN: 4.5 G/DL (ref 3.5–5.2)
ALP BLD-CCNC: 63 U/L (ref 40–129)
ALT SERPL-CCNC: 23 U/L (ref 0–40)
ANION GAP SERPL CALCULATED.3IONS-SCNC: 16 MMOL/L (ref 7–16)
AST SERPL-CCNC: 19 U/L (ref 0–39)
BASOPHILS ABSOLUTE: 0.1 K/UL (ref 0–0.2)
BASOPHILS RELATIVE PERCENT: 1 % (ref 0–2)
BILIRUB SERPL-MCNC: 0.3 MG/DL (ref 0–1.2)
BILIRUBIN, URINE: NEGATIVE
BUN BLDV-MCNC: 11 MG/DL (ref 6–20)
CALCIUM SERPL-MCNC: 9.7 MG/DL (ref 8.6–10.2)
CHLORIDE BLD-SCNC: 101 MMOL/L (ref 98–107)
CHOLESTEROL, TOTAL: 120 MG/DL
CO2: 21 MMOL/L (ref 22–29)
COLOR, UA: YELLOW
COMMENT: NORMAL
CREAT SERPL-MCNC: 0.8 MG/DL (ref 0.7–1.2)
CREATININE URINE: 144.3 MG/DL (ref 40–278)
EOSINOPHILS ABSOLUTE: 0.17 K/UL (ref 0.05–0.5)
EOSINOPHILS RELATIVE PERCENT: 2 % (ref 0–6)
FOLATE: 9.7 NG/ML (ref 4.8–24.2)
GFR, ESTIMATED: >90 ML/MIN/1.73M2
GLUCOSE BLD-MCNC: 112 MG/DL (ref 74–99)
GLUCOSE URINE: NEGATIVE MG/DL
HBA1C MFR BLD: 7.1 % (ref 4–5.6)
HCT VFR BLD CALC: 42.5 % (ref 37–54)
HDLC SERPL-MCNC: 34 MG/DL
HEMOGLOBIN: 14 G/DL (ref 12.5–16.5)
IMMATURE GRANULOCYTES %: 0 % (ref 0–5)
IMMATURE GRANULOCYTES ABSOLUTE: <0.03 K/UL (ref 0–0.58)
KETONES, URINE: NEGATIVE MG/DL
LDL CHOLESTEROL: 61 MG/DL
LEUKOCYTE ESTERASE, URINE: NEGATIVE
LYMPHOCYTES ABSOLUTE: 3 K/UL (ref 1.5–4)
LYMPHOCYTES RELATIVE PERCENT: 41 % (ref 20–42)
MCH RBC QN AUTO: 29.6 PG (ref 26–35)
MCHC RBC AUTO-ENTMCNC: 32.9 G/DL (ref 32–34.5)
MCV RBC AUTO: 89.9 FL (ref 80–99.9)
MICROALBUMIN/CREAT 24H UR: 51 MG/L (ref 0–19)
MICROALBUMIN/CREAT UR-RTO: 35 MCG/MG CREAT (ref 0–30)
MONOCYTES ABSOLUTE: 0.47 K/UL (ref 0.1–0.95)
MONOCYTES RELATIVE PERCENT: 7 % (ref 2–12)
NEUTROPHILS ABSOLUTE: 3.51 K/UL (ref 1.8–7.3)
NEUTROPHILS RELATIVE PERCENT: 48 % (ref 43–80)
NITRITE, URINE: NEGATIVE
PDW BLD-RTO: 13.1 % (ref 11.5–15)
PH, URINE: 5.5 (ref 5–9)
PLATELET # BLD: 244 K/UL (ref 130–450)
PMV BLD AUTO: 10.7 FL (ref 7–12)
POTASSIUM SERPL-SCNC: 4.6 MMOL/L (ref 3.5–5)
PROTEIN UA: NEGATIVE MG/DL
RBC # BLD: 4.73 M/UL (ref 3.8–5.8)
SODIUM BLD-SCNC: 138 MMOL/L (ref 132–146)
SPECIFIC GRAVITY UA: 1.02 (ref 1–1.03)
TOTAL PROTEIN: 7.3 G/DL (ref 6.4–8.3)
TRIGL SERPL-MCNC: 127 MG/DL
TSH SERPL DL<=0.05 MIU/L-ACNC: 3.22 UIU/ML (ref 0.27–4.2)
TURBIDITY: CLEAR
URINE HGB: NEGATIVE
UROBILINOGEN, URINE: 0.2 EU/DL (ref 0–1)
VITAMIN B-12: 527 PG/ML (ref 211–946)
VITAMIN D 25-HYDROXY: 30.3 NG/ML (ref 30–100)
VLDLC SERPL CALC-MCNC: 25 MG/DL
WBC # BLD: 7.3 K/UL (ref 4.5–11.5)

## 2024-10-14 DIAGNOSIS — Z79.4 TYPE 2 DIABETES MELLITUS WITHOUT COMPLICATION, WITH LONG-TERM CURRENT USE OF INSULIN (HCC): Primary | ICD-10-CM

## 2024-10-14 DIAGNOSIS — E11.9 TYPE 2 DIABETES MELLITUS WITHOUT COMPLICATION, WITH LONG-TERM CURRENT USE OF INSULIN (HCC): Primary | ICD-10-CM

## 2024-10-15 ENCOUNTER — OFFICE VISIT (OUTPATIENT)
Dept: PRIMARY CARE CLINIC | Age: 54
End: 2024-10-15
Payer: COMMERCIAL

## 2024-10-15 VITALS
OXYGEN SATURATION: 97 % | BODY MASS INDEX: 34.21 KG/M2 | RESPIRATION RATE: 18 BRPM | WEIGHT: 218 LBS | DIASTOLIC BLOOD PRESSURE: 82 MMHG | TEMPERATURE: 97.9 F | SYSTOLIC BLOOD PRESSURE: 122 MMHG | HEIGHT: 67 IN | HEART RATE: 98 BPM

## 2024-10-15 DIAGNOSIS — E55.9 VITAMIN D DEFICIENCY: ICD-10-CM

## 2024-10-15 DIAGNOSIS — Z12.5 PROSTATE CANCER SCREENING: ICD-10-CM

## 2024-10-15 DIAGNOSIS — I10 ESSENTIAL HYPERTENSION: ICD-10-CM

## 2024-10-15 DIAGNOSIS — E11.65 TYPE 2 DIABETES MELLITUS WITH HYPERGLYCEMIA, WITH LONG-TERM CURRENT USE OF INSULIN (HCC): Primary | ICD-10-CM

## 2024-10-15 DIAGNOSIS — Z00.00 HEALTH CARE MAINTENANCE: ICD-10-CM

## 2024-10-15 DIAGNOSIS — Z79.4 TYPE 2 DIABETES MELLITUS WITH HYPERGLYCEMIA, WITH LONG-TERM CURRENT USE OF INSULIN (HCC): Primary | ICD-10-CM

## 2024-10-15 DIAGNOSIS — E78.2 MIXED HYPERLIPIDEMIA: ICD-10-CM

## 2024-10-15 DIAGNOSIS — E53.8 VITAMIN B12 DEFICIENCY: ICD-10-CM

## 2024-10-15 DIAGNOSIS — N52.9 ERECTILE DYSFUNCTION, UNSPECIFIED ERECTILE DYSFUNCTION TYPE: ICD-10-CM

## 2024-10-15 DIAGNOSIS — R80.9 PROTEINURIA, UNSPECIFIED TYPE: ICD-10-CM

## 2024-10-15 DIAGNOSIS — N52.9 MALE ERECTILE DISORDER: ICD-10-CM

## 2024-10-15 PROCEDURE — 3051F HG A1C>EQUAL 7.0%<8.0%: CPT | Performed by: FAMILY MEDICINE

## 2024-10-15 PROCEDURE — 99214 OFFICE O/P EST MOD 30 MIN: CPT | Performed by: FAMILY MEDICINE

## 2024-10-15 PROCEDURE — 3079F DIAST BP 80-89 MM HG: CPT | Performed by: FAMILY MEDICINE

## 2024-10-15 PROCEDURE — 3074F SYST BP LT 130 MM HG: CPT | Performed by: FAMILY MEDICINE

## 2024-10-15 RX ORDER — TADALAFIL 20 MG/1
20 TABLET ORAL DAILY PRN
Qty: 10 TABLET | Refills: 3 | Status: SHIPPED | OUTPATIENT
Start: 2024-10-15

## 2024-10-15 NOTE — PROGRESS NOTES
reviewed. Watch closely ambulatory. Hyper and hypotensive precautions and parameters reviewed and written as well as parameters on pulse, call if out of range, ER dangers numbers. Lifestyle modification reviewed.  standard precautions of lisinopril reviewed including RI, electrolyte imbalance, angioedema, cough.      Health maintenance examination  Counseled at length 9/18/2023.  Encouraged yearly physicals.  Defers vaccines     Male erectile disorder  Counseled extensively. Differential reviewed, including serious etiologies. Treatment  options reviewed.  Did well with Cialis but cost was prohibitive, did not respond to sildenafil.  Now back on Cialis 10 mg, minimal effects, increased to c 20 mg daily as needed with risks and benefits reviewed, including absolute contra indication with nitrates  and alpha blockers. Counseled on orthostatic precautions.     He is considering injections                     No data to display                Plan as above.  Counseled extensively and differential diagnoses relevant to above were reviewed, including serious etiologies, risks and complications, especially of left uncontrolled.  If relevant, instructions and  alternatives to meds/treatment reviewed, as well as interactions, and  SE's/ADRs reviewed, notify immediately if any, discontinuing new meds if any.  Plan made after discussion and shared decision making.    Counseled.  Overall doing well, continue current management, precautions reviewed, continue per specialist, plan blood work and follow-up 3 months sooner as needed.  Increase Cialis    As long as symptoms steadily improve/resolve, and medical conditions follow the expected course, FU as below, sooner PRN.    Return in about 3 months (around 1/15/2025), or if symptoms worsen or fail to improve.                 Educational materials and/or home exercises printed for patient's review and were included in patient instructions on his/her After Visit Summary and given

## 2024-11-20 DIAGNOSIS — E11.9 TYPE 2 DIABETES MELLITUS WITHOUT COMPLICATIONS (HCC): ICD-10-CM

## 2024-11-21 RX ORDER — INSULIN LISPRO 100 [IU]/ML
INJECTION, SOLUTION INTRAVENOUS; SUBCUTANEOUS
Qty: 81 ML | Refills: 1 | Status: SHIPPED | OUTPATIENT
Start: 2024-11-21

## 2025-01-10 DIAGNOSIS — E55.9 VITAMIN D DEFICIENCY: ICD-10-CM

## 2025-01-10 DIAGNOSIS — Z79.4 TYPE 2 DIABETES MELLITUS WITH HYPERGLYCEMIA, WITH LONG-TERM CURRENT USE OF INSULIN (HCC): ICD-10-CM

## 2025-01-10 DIAGNOSIS — E78.2 MIXED HYPERLIPIDEMIA: ICD-10-CM

## 2025-01-10 DIAGNOSIS — E11.65 TYPE 2 DIABETES MELLITUS WITH HYPERGLYCEMIA, WITH LONG-TERM CURRENT USE OF INSULIN (HCC): ICD-10-CM

## 2025-01-10 DIAGNOSIS — Z12.5 PROSTATE CANCER SCREENING: ICD-10-CM

## 2025-01-10 DIAGNOSIS — E53.8 VITAMIN B12 DEFICIENCY: ICD-10-CM

## 2025-01-10 DIAGNOSIS — E87.5 HYPERKALEMIA: Primary | ICD-10-CM

## 2025-01-10 LAB
ALBUMIN: 4.3 G/DL (ref 3.5–5.2)
ALP BLD-CCNC: 103 U/L (ref 40–129)
ALT SERPL-CCNC: 19 U/L (ref 0–40)
ANION GAP SERPL CALCULATED.3IONS-SCNC: 15 MMOL/L (ref 7–16)
AST SERPL-CCNC: 20 U/L (ref 0–39)
BASOPHILS ABSOLUTE: 0.07 K/UL (ref 0–0.2)
BASOPHILS RELATIVE PERCENT: 1 % (ref 0–2)
BILIRUB SERPL-MCNC: 0.2 MG/DL (ref 0–1.2)
BILIRUBIN, URINE: NEGATIVE
BUN BLDV-MCNC: 17 MG/DL (ref 6–20)
CALCIUM SERPL-MCNC: 9.7 MG/DL (ref 8.6–10.2)
CHLORIDE BLD-SCNC: 106 MMOL/L (ref 98–107)
CHOLESTEROL, TOTAL: 123 MG/DL
CO2: 20 MMOL/L (ref 22–29)
COLOR, UA: YELLOW
COMMENT: NORMAL
CREAT SERPL-MCNC: 0.8 MG/DL (ref 0.7–1.2)
CREATININE URINE: 116.5 MG/DL (ref 40–278)
EOSINOPHILS ABSOLUTE: 0.14 K/UL (ref 0.05–0.5)
EOSINOPHILS RELATIVE PERCENT: 2 % (ref 0–6)
FOLATE: 8.8 NG/ML (ref 4.8–24.2)
GFR, ESTIMATED: >90 ML/MIN/1.73M2
GLUCOSE BLD-MCNC: 122 MG/DL (ref 74–99)
GLUCOSE URINE: NEGATIVE MG/DL
HBA1C MFR BLD: 7.7 % (ref 4–5.6)
HCT VFR BLD CALC: 41.2 % (ref 37–54)
HDLC SERPL-MCNC: 27 MG/DL
HEMOGLOBIN: 13.6 G/DL (ref 12.5–16.5)
IMMATURE GRANULOCYTES %: 0 % (ref 0–5)
IMMATURE GRANULOCYTES ABSOLUTE: <0.03 K/UL (ref 0–0.58)
KETONES, URINE: NEGATIVE MG/DL
LDL CHOLESTEROL: 50 MG/DL
LEUKOCYTE ESTERASE, URINE: NEGATIVE
LYMPHOCYTES ABSOLUTE: 2.36 K/UL (ref 1.5–4)
LYMPHOCYTES RELATIVE PERCENT: 39 % (ref 20–42)
MCH RBC QN AUTO: 29.6 PG (ref 26–35)
MCHC RBC AUTO-ENTMCNC: 33 G/DL (ref 32–34.5)
MCV RBC AUTO: 89.6 FL (ref 80–99.9)
MICROALBUMIN/CREAT 24H UR: 46 MG/L (ref 0–19)
MICROALBUMIN/CREAT UR-RTO: 39 MCG/MG CREAT (ref 0–30)
MONOCYTES ABSOLUTE: 0.45 K/UL (ref 0.1–0.95)
MONOCYTES RELATIVE PERCENT: 8 % (ref 2–12)
NEUTROPHILS ABSOLUTE: 2.95 K/UL (ref 1.8–7.3)
NEUTROPHILS RELATIVE PERCENT: 49 % (ref 43–80)
NITRITE, URINE: NEGATIVE
PDW BLD-RTO: 12.4 % (ref 11.5–15)
PH, URINE: 5.5 (ref 5–9)
PLATELET # BLD: 241 K/UL (ref 130–450)
PMV BLD AUTO: 11 FL (ref 7–12)
POTASSIUM SERPL-SCNC: 5.2 MMOL/L (ref 3.5–5)
PROSTATE SPECIFIC ANTIGEN: 0.53 NG/ML (ref 0–4)
PROTEIN UA: NEGATIVE MG/DL
RBC # BLD: 4.6 M/UL (ref 3.8–5.8)
SODIUM BLD-SCNC: 141 MMOL/L (ref 132–146)
SPECIFIC GRAVITY UA: 1.02 (ref 1–1.03)
TOTAL CK: 150 U/L (ref 20–200)
TOTAL PROTEIN: 7.5 G/DL (ref 6.4–8.3)
TRIGL SERPL-MCNC: 231 MG/DL
TSH SERPL DL<=0.05 MIU/L-ACNC: 1.86 UIU/ML (ref 0.27–4.2)
TURBIDITY: CLEAR
URINE HGB: NEGATIVE
UROBILINOGEN, URINE: 0.2 EU/DL (ref 0–1)
VITAMIN B-12: 561 PG/ML (ref 211–946)
VITAMIN D 25-HYDROXY: 26.4 NG/ML (ref 30–100)
VLDLC SERPL CALC-MCNC: 46 MG/DL
WBC # BLD: 6 K/UL (ref 4.5–11.5)

## 2025-01-10 NOTE — RESULT ENCOUNTER NOTE
Hemoglobin A1c higher at 7.7, continue per endocrinology.  Potassium mildly elevated 5.2.  Often a false positive this lab but if willing to be repeated can repeat nonfasting.  Encourage proper hydration avoid excess potassium intake or potassium supplements.  Keep follow-up to review in more detail sooner as needed

## 2025-01-21 ENCOUNTER — OFFICE VISIT (OUTPATIENT)
Dept: PRIMARY CARE CLINIC | Age: 55
End: 2025-01-21
Payer: COMMERCIAL

## 2025-01-21 ENCOUNTER — OFFICE VISIT (OUTPATIENT)
Dept: ENDOCRINOLOGY | Age: 55
End: 2025-01-21
Payer: COMMERCIAL

## 2025-01-21 VITALS
HEIGHT: 67 IN | TEMPERATURE: 96.1 F | WEIGHT: 223 LBS | BODY MASS INDEX: 35 KG/M2 | SYSTOLIC BLOOD PRESSURE: 148 MMHG | OXYGEN SATURATION: 99 % | DIASTOLIC BLOOD PRESSURE: 95 MMHG | HEART RATE: 95 BPM

## 2025-01-21 VITALS
DIASTOLIC BLOOD PRESSURE: 88 MMHG | TEMPERATURE: 97.8 F | OXYGEN SATURATION: 100 % | WEIGHT: 244 LBS | HEART RATE: 99 BPM | HEIGHT: 67 IN | BODY MASS INDEX: 38.3 KG/M2 | SYSTOLIC BLOOD PRESSURE: 138 MMHG

## 2025-01-21 DIAGNOSIS — E55.9 VITAMIN D DEFICIENCY: ICD-10-CM

## 2025-01-21 DIAGNOSIS — E11.65 TYPE 2 DIABETES MELLITUS WITH HYPERGLYCEMIA, WITH LONG-TERM CURRENT USE OF INSULIN (HCC): Primary | ICD-10-CM

## 2025-01-21 DIAGNOSIS — N52.9 ERECTILE DYSFUNCTION, UNSPECIFIED ERECTILE DYSFUNCTION TYPE: ICD-10-CM

## 2025-01-21 DIAGNOSIS — I10 ESSENTIAL HYPERTENSION: ICD-10-CM

## 2025-01-21 DIAGNOSIS — E53.8 VITAMIN B12 DEFICIENCY: ICD-10-CM

## 2025-01-21 DIAGNOSIS — Z79.4 TYPE 2 DIABETES MELLITUS WITH HYPERGLYCEMIA, WITH LONG-TERM CURRENT USE OF INSULIN (HCC): Primary | ICD-10-CM

## 2025-01-21 DIAGNOSIS — E78.1 HYPERTRIGLYCERIDEMIA: ICD-10-CM

## 2025-01-21 DIAGNOSIS — R80.9 PROTEINURIA, UNSPECIFIED TYPE: ICD-10-CM

## 2025-01-21 DIAGNOSIS — Z91.119 DIETARY NONCOMPLIANCE: ICD-10-CM

## 2025-01-21 DIAGNOSIS — E78.2 MIXED HYPERLIPIDEMIA: ICD-10-CM

## 2025-01-21 PROCEDURE — 3075F SYST BP GE 130 - 139MM HG: CPT | Performed by: FAMILY MEDICINE

## 2025-01-21 PROCEDURE — 3079F DIAST BP 80-89 MM HG: CPT | Performed by: FAMILY MEDICINE

## 2025-01-21 PROCEDURE — 3077F SYST BP >= 140 MM HG: CPT | Performed by: CLINICAL NURSE SPECIALIST

## 2025-01-21 PROCEDURE — 99214 OFFICE O/P EST MOD 30 MIN: CPT | Performed by: FAMILY MEDICINE

## 2025-01-21 PROCEDURE — 99214 OFFICE O/P EST MOD 30 MIN: CPT | Performed by: CLINICAL NURSE SPECIALIST

## 2025-01-21 PROCEDURE — 3051F HG A1C>EQUAL 7.0%<8.0%: CPT | Performed by: CLINICAL NURSE SPECIALIST

## 2025-01-21 PROCEDURE — 3080F DIAST BP >= 90 MM HG: CPT | Performed by: CLINICAL NURSE SPECIALIST

## 2025-01-21 PROCEDURE — 95251 CONT GLUC MNTR ANALYSIS I&R: CPT | Performed by: CLINICAL NURSE SPECIALIST

## 2025-01-21 PROCEDURE — 3051F HG A1C>EQUAL 7.0%<8.0%: CPT | Performed by: FAMILY MEDICINE

## 2025-01-21 SDOH — ECONOMIC STABILITY: FOOD INSECURITY: WITHIN THE PAST 12 MONTHS, YOU WORRIED THAT YOUR FOOD WOULD RUN OUT BEFORE YOU GOT MONEY TO BUY MORE.: NEVER TRUE

## 2025-01-21 SDOH — ECONOMIC STABILITY: FOOD INSECURITY: WITHIN THE PAST 12 MONTHS, THE FOOD YOU BOUGHT JUST DIDN'T LAST AND YOU DIDN'T HAVE MONEY TO GET MORE.: NEVER TRUE

## 2025-01-21 ASSESSMENT — PATIENT HEALTH QUESTIONNAIRE - PHQ9
SUM OF ALL RESPONSES TO PHQ QUESTIONS 1-9: 0
1. LITTLE INTEREST OR PLEASURE IN DOING THINGS: NOT AT ALL
SUM OF ALL RESPONSES TO PHQ QUESTIONS 1-9: 0
SUM OF ALL RESPONSES TO PHQ QUESTIONS 1-9: 0
SUM OF ALL RESPONSES TO PHQ9 QUESTIONS 1 & 2: 0
2. FEELING DOWN, DEPRESSED OR HOPELESS: NOT AT ALL
SUM OF ALL RESPONSES TO PHQ QUESTIONS 1-9: 0

## 2025-01-21 NOTE — PROGRESS NOTES
contain grammatical errors.This note or partial portions of this note may have been created using a copy forward or copy paste feature but these portions have been verified and re-edited for accuracy and any portions not in need of editing or reviews are not being used to generate any component necessary for billing purposes.  Some portions may be carried over for continuity purposes and to aid me with monitoring of past medical conditions and discussions.  Elements necessary for proper CPT code selection are based only on elements of the visit that are truly unique to this visit.

## 2025-01-21 NOTE — PROGRESS NOTES
LABGLOM >90 04/15/2024 08:44 AM    GFRAA >60 08/01/2022 08:39 AM      Lab Results   Component Value Date/Time    TSH 1.86 01/10/2025 08:44 AM     Lab Results   Component Value Date/Time    LABA1C 7.7 01/10/2025 08:44 AM    GLUCOSE 122 01/10/2025 08:44 AM    MALBCR 39 01/10/2025 10:04 AM     Lab Results   Component Value Date/Time    LABA1C 7.7 01/10/2025 08:44 AM    LABA1C 7.1 10/09/2024 08:25 AM    LABA1C 7.6 07/15/2024 09:42 AM     Lab Results   Component Value Date/Time    TRIG 231 01/10/2025 08:44 AM    HDL 27 01/10/2025 08:44 AM    CHOL 123 01/10/2025 08:44 AM     Lab Results   Component Value Date/Time    VITD25 26.4 01/10/2025 08:44 AM    VITD25 30.3 10/09/2024 08:25 AM       Medical Records/Labs/Images review:   I personally reviewed and summarized previous records   All labs and imaging studies were independently reviewed     ASSESSMENT & RECOMMENDATIONS   Carlitos Das, a 54 y.o.-old male seen in for the following issues     Diabetes Mellitus Type 2       Pt diabetes well-controlled per justin.  Hemoglobin A1c 7.7%  Ozempic was not covered by insurance  Continue Metformin 1000 mg twice a day,  Decrease Tresiba to 34 units daily at bedtime , continue Hlog to 22 units TID with meals units + ss 3:50>150   Start Mounjaro 2.5 mg weekly for 4 weeks  No contraindications.  Counseled on side effects  Advised patient to check blood sugars 4 times per day  Send blood glucose log in 2 weeks  Discussed with patient A1c and blood sugar goals   Encourage diet and exercise  Last microalbumin WNL  Continue lisinopril.  I encouraged optimal blood glucose control    Dietary noncompliance  Improved since last visit   Discussed with patient the importance of eating consistent carbohydrate meals, avoiding high glycemic index food. Also, discussed with patient the risk and negative consequences of dietary noncompliance on blood glucose control, blood pressure and weight    vitD deficiency   Continue vitD supplement

## 2025-01-29 DIAGNOSIS — Z79.4 TYPE 2 DIABETES MELLITUS WITH HYPERGLYCEMIA, WITH LONG-TERM CURRENT USE OF INSULIN (HCC): Primary | ICD-10-CM

## 2025-01-29 DIAGNOSIS — E11.65 TYPE 2 DIABETES MELLITUS WITH HYPERGLYCEMIA, WITH LONG-TERM CURRENT USE OF INSULIN (HCC): Primary | ICD-10-CM

## 2025-01-29 RX ORDER — ACYCLOVIR 800 MG/1
TABLET ORAL
Qty: 6 EACH | Refills: 1 | Status: SHIPPED | OUTPATIENT
Start: 2025-01-29

## 2025-02-10 DIAGNOSIS — E11.65 TYPE 2 DIABETES MELLITUS WITH HYPERGLYCEMIA, WITH LONG-TERM CURRENT USE OF INSULIN (HCC): Primary | ICD-10-CM

## 2025-02-10 DIAGNOSIS — Z79.4 TYPE 2 DIABETES MELLITUS WITH HYPERGLYCEMIA, WITH LONG-TERM CURRENT USE OF INSULIN (HCC): Primary | ICD-10-CM

## 2025-02-10 RX ORDER — TIRZEPATIDE 5 MG/.5ML
INJECTION, SOLUTION SUBCUTANEOUS
Qty: 2 ML | Refills: 2 | Status: SHIPPED | OUTPATIENT
Start: 2025-02-10

## 2025-02-18 DIAGNOSIS — E11.9 TYPE 2 DIABETES MELLITUS WITHOUT COMPLICATIONS (HCC): ICD-10-CM

## 2025-02-18 DIAGNOSIS — E78.2 MIXED HYPERLIPIDEMIA: ICD-10-CM

## 2025-02-18 RX ORDER — ATORVASTATIN CALCIUM 40 MG/1
40 TABLET, FILM COATED ORAL DAILY
Qty: 90 TABLET | Refills: 1 | Status: SHIPPED | OUTPATIENT
Start: 2025-02-18

## 2025-02-18 RX ORDER — INSULIN LISPRO 100 [IU]/ML
INJECTION, SOLUTION INTRAVENOUS; SUBCUTANEOUS
Qty: 90 ML | Refills: 1 | Status: SHIPPED | OUTPATIENT
Start: 2025-02-18

## 2025-02-28 DIAGNOSIS — E78.2 MIXED HYPERLIPIDEMIA: ICD-10-CM

## 2025-02-28 RX ORDER — FENOFIBRIC ACID 135 MG/1
135 CAPSULE, DELAYED RELEASE ORAL DAILY
Qty: 90 CAPSULE | Refills: 1 | Status: SHIPPED | OUTPATIENT
Start: 2025-02-28

## 2025-03-12 DIAGNOSIS — E78.2 MIXED HYPERLIPIDEMIA: ICD-10-CM

## 2025-03-12 RX ORDER — OMEGA-3-ACID ETHYL ESTERS 1 G/1
2 CAPSULE, LIQUID FILLED ORAL 2 TIMES DAILY
Qty: 360 CAPSULE | Refills: 1 | Status: SHIPPED | OUTPATIENT
Start: 2025-03-12

## 2025-03-13 ENCOUNTER — TELEPHONE (OUTPATIENT)
Dept: ENDOCRINOLOGY | Age: 55
End: 2025-03-13

## 2025-03-13 NOTE — TELEPHONE ENCOUNTER
[facility] report attached for review     Continue Metformin 1000 mg twice a day,  Decrease Tresiba to 34 units daily at bedtime , continue Hlog to 22 units TID with meals units + ss 3:50>150   Start Mounjaro 2.5 mg weekly for 4 weeks

## 2025-03-14 NOTE — TELEPHONE ENCOUNTER
Left a detailed message and sent mychart message  for Carlitos Das with instructions to call the office with any questions

## 2025-03-16 DIAGNOSIS — E11.65 TYPE 2 DIABETES MELLITUS WITH HYPERGLYCEMIA, WITH LONG-TERM CURRENT USE OF INSULIN (HCC): ICD-10-CM

## 2025-03-16 DIAGNOSIS — I10 ESSENTIAL HYPERTENSION: ICD-10-CM

## 2025-03-16 DIAGNOSIS — Z79.4 TYPE 2 DIABETES MELLITUS WITH HYPERGLYCEMIA, WITH LONG-TERM CURRENT USE OF INSULIN (HCC): ICD-10-CM

## 2025-03-17 RX ORDER — LISINOPRIL 10 MG/1
10 TABLET ORAL DAILY
Qty: 90 TABLET | Refills: 1 | Status: SHIPPED | OUTPATIENT
Start: 2025-03-17

## 2025-03-17 RX ORDER — TIRZEPATIDE 5 MG/.5ML
INJECTION, SOLUTION SUBCUTANEOUS
Qty: 2 ML | Refills: 2 | Status: SHIPPED | OUTPATIENT
Start: 2025-03-17

## 2025-03-17 NOTE — TELEPHONE ENCOUNTER
1/21/25: Start Mounjaro 2.5 mg weekly for 4 weeks   2/8/25:   Please have patient increase dose to 5 mg weekly.  Please send me prescription and I will send     3/13/25:   Decrease Tresiba to 28 and decrease Humalog to 18 units 3 times daily with meals   sugars are WAY down, appetite is less. Some upset stomach but nothing terrible (just a reduction in meal size needed). tolerable. 3lb weight loss.   : I have completed the 4 doses at this level.  What's next?

## 2025-03-27 DIAGNOSIS — Z79.4 TYPE 2 DIABETES MELLITUS WITH HYPERGLYCEMIA, WITH LONG-TERM CURRENT USE OF INSULIN (HCC): ICD-10-CM

## 2025-03-27 DIAGNOSIS — E11.65 TYPE 2 DIABETES MELLITUS WITH HYPERGLYCEMIA (HCC): ICD-10-CM

## 2025-03-27 DIAGNOSIS — E11.65 TYPE 2 DIABETES MELLITUS WITH HYPERGLYCEMIA, WITH LONG-TERM CURRENT USE OF INSULIN (HCC): ICD-10-CM

## 2025-03-28 RX ORDER — PEN NEEDLE, DIABETIC 32GX 5/32"
NEEDLE, DISPOSABLE MISCELLANEOUS
Qty: 400 EACH | Refills: 3 | Status: SHIPPED | OUTPATIENT
Start: 2025-03-28

## 2025-04-06 ENCOUNTER — PATIENT MESSAGE (OUTPATIENT)
Dept: ENDOCRINOLOGY | Age: 55
End: 2025-04-06

## 2025-04-06 DIAGNOSIS — E11.9 TYPE 2 DIABETES MELLITUS WITHOUT COMPLICATION, UNSPECIFIED WHETHER LONG TERM INSULIN USE: Primary | ICD-10-CM

## 2025-04-10 NOTE — TELEPHONE ENCOUNTER
If he is doing well on 5 mg we can continue same.  If he is not noticing any weight loss or improved blood sugar control we can increase to 7.5 mg weekly.  Please talk to patient and after talking with patient please send me updated prescription on what he wants to do

## 2025-05-13 ENCOUNTER — PATIENT MESSAGE (OUTPATIENT)
Dept: ENDOCRINOLOGY | Age: 55
End: 2025-05-13

## 2025-05-13 RX ORDER — TIRZEPATIDE 10 MG/.5ML
10 INJECTION, SOLUTION SUBCUTANEOUS WEEKLY
Qty: 2 ML | Refills: 1 | Status: SHIPPED | OUTPATIENT
Start: 2025-05-13

## 2025-05-13 RX ORDER — TIRZEPATIDE 10 MG/.5ML
10 INJECTION, SOLUTION SUBCUTANEOUS WEEKLY
COMMUNITY
End: 2025-05-13 | Stop reason: SDUPTHER

## 2025-05-20 ENCOUNTER — RESULTS FOLLOW-UP (OUTPATIENT)
Dept: PRIMARY CARE CLINIC | Age: 55
End: 2025-05-20

## 2025-05-20 DIAGNOSIS — I10 ESSENTIAL HYPERTENSION: ICD-10-CM

## 2025-05-20 DIAGNOSIS — E11.65 TYPE 2 DIABETES MELLITUS WITH HYPERGLYCEMIA, WITH LONG-TERM CURRENT USE OF INSULIN (HCC): ICD-10-CM

## 2025-05-20 DIAGNOSIS — E55.9 VITAMIN D DEFICIENCY: ICD-10-CM

## 2025-05-20 DIAGNOSIS — E78.2 MIXED HYPERLIPIDEMIA: ICD-10-CM

## 2025-05-20 DIAGNOSIS — Z79.4 TYPE 2 DIABETES MELLITUS WITH HYPERGLYCEMIA, WITH LONG-TERM CURRENT USE OF INSULIN (HCC): ICD-10-CM

## 2025-05-20 DIAGNOSIS — E53.8 VITAMIN B12 DEFICIENCY: ICD-10-CM

## 2025-05-20 LAB
ALBUMIN: 4.4 G/DL (ref 3.5–5.2)
ALP BLD-CCNC: 60 U/L (ref 40–129)
ALT SERPL-CCNC: 21 U/L (ref 0–50)
ANION GAP SERPL CALCULATED.3IONS-SCNC: 13 MMOL/L (ref 7–16)
AST SERPL-CCNC: 22 U/L (ref 0–50)
BASOPHILS ABSOLUTE: 0.1 K/UL (ref 0–0.2)
BASOPHILS RELATIVE PERCENT: 1 % (ref 0–2)
BILIRUB SERPL-MCNC: <0.2 MG/DL (ref 0–1.2)
BILIRUBIN, URINE: NEGATIVE
BUN BLDV-MCNC: 14 MG/DL (ref 6–20)
CALCIUM SERPL-MCNC: 9.7 MG/DL (ref 8.6–10)
CHLORIDE BLD-SCNC: 103 MMOL/L (ref 98–107)
CHOLESTEROL, TOTAL: 113 MG/DL
CO2: 21 MMOL/L (ref 22–29)
COLOR, UA: YELLOW
COMMENT: NORMAL
CREAT SERPL-MCNC: 0.9 MG/DL (ref 0.7–1.2)
CREATININE URINE: 154 MG/DL (ref 40–278)
EOSINOPHILS ABSOLUTE: 0.18 K/UL (ref 0.05–0.5)
EOSINOPHILS RELATIVE PERCENT: 2 % (ref 0–6)
FOLATE: 5.6 NG/ML (ref 4.6–34.8)
GFR, ESTIMATED: >90 ML/MIN/1.73M2
GLUCOSE BLD-MCNC: 133 MG/DL (ref 74–99)
GLUCOSE URINE: NEGATIVE MG/DL
HBA1C MFR BLD: 6.8 % (ref 4–5.6)
HCT VFR BLD CALC: 41.9 % (ref 37–54)
HDLC SERPL-MCNC: 37 MG/DL
HEMOGLOBIN: 14 G/DL (ref 12.5–16.5)
IMMATURE GRANULOCYTES %: 0 % (ref 0–5)
IMMATURE GRANULOCYTES ABSOLUTE: 0.03 K/UL (ref 0–0.58)
KETONES, URINE: NEGATIVE MG/DL
LDL CHOLESTEROL: 42 MG/DL
LEUKOCYTE ESTERASE, URINE: NEGATIVE
LYMPHOCYTES ABSOLUTE: 2.82 K/UL (ref 1.5–4)
LYMPHOCYTES RELATIVE PERCENT: 35 % (ref 20–42)
MCH RBC QN AUTO: 29.7 PG (ref 26–35)
MCHC RBC AUTO-ENTMCNC: 33.4 G/DL (ref 32–34.5)
MCV RBC AUTO: 89 FL (ref 80–99.9)
MICROALBUMIN/CREAT 24H UR: 27 MG/L (ref 0–20)
MICROALBUMIN/CREAT UR-RTO: 18 MCG/MG CREAT (ref 0–30)
MONOCYTES ABSOLUTE: 0.37 K/UL (ref 0.1–0.95)
MONOCYTES RELATIVE PERCENT: 5 % (ref 2–12)
NEUTROPHILS ABSOLUTE: 4.51 K/UL (ref 1.8–7.3)
NEUTROPHILS RELATIVE PERCENT: 56 % (ref 43–80)
NITRITE, URINE: NEGATIVE
PDW BLD-RTO: 12.9 % (ref 11.5–15)
PH, URINE: 5.5 (ref 5–8)
PLATELET # BLD: 254 K/UL (ref 130–450)
PMV BLD AUTO: 10.6 FL (ref 7–12)
POTASSIUM SERPL-SCNC: 4.6 MMOL/L (ref 3.5–5.1)
PROTEIN UA: NEGATIVE MG/DL
RBC # BLD: 4.71 M/UL (ref 3.8–5.8)
SODIUM BLD-SCNC: 136 MMOL/L (ref 136–145)
SPECIFIC GRAVITY UA: 1.02 (ref 1–1.03)
TOTAL CK: 132 U/L (ref 0–190)
TOTAL PROTEIN: 7.3 G/DL (ref 6.4–8.3)
TRIGL SERPL-MCNC: 173 MG/DL
TSH SERPL DL<=0.05 MIU/L-ACNC: 2.26 UIU/ML (ref 0.27–4.2)
TURBIDITY: CLEAR
URINE HGB: NEGATIVE
UROBILINOGEN, URINE: 0.2 EU/DL (ref 0–1)
VITAMIN B-12: 384 PG/ML (ref 232–1245)
VITAMIN D 25-HYDROXY: 26.8 NG/ML (ref 30–100)
VLDLC SERPL CALC-MCNC: 35 MG/DL
WBC # BLD: 8 K/UL (ref 4.5–11.5)

## 2025-05-21 ENCOUNTER — OFFICE VISIT (OUTPATIENT)
Dept: PRIMARY CARE CLINIC | Age: 55
End: 2025-05-21
Payer: COMMERCIAL

## 2025-05-21 ENCOUNTER — OFFICE VISIT (OUTPATIENT)
Dept: ENDOCRINOLOGY | Age: 55
End: 2025-05-21
Payer: COMMERCIAL

## 2025-05-21 VITALS
OXYGEN SATURATION: 98 % | BODY MASS INDEX: 34.37 KG/M2 | TEMPERATURE: 98.3 F | HEIGHT: 67 IN | DIASTOLIC BLOOD PRESSURE: 84 MMHG | WEIGHT: 219 LBS | SYSTOLIC BLOOD PRESSURE: 132 MMHG | HEART RATE: 97 BPM

## 2025-05-21 VITALS
DIASTOLIC BLOOD PRESSURE: 90 MMHG | HEART RATE: 107 BPM | WEIGHT: 219.2 LBS | OXYGEN SATURATION: 98 % | BODY MASS INDEX: 34.33 KG/M2 | SYSTOLIC BLOOD PRESSURE: 136 MMHG | TEMPERATURE: 98.1 F

## 2025-05-21 DIAGNOSIS — Z91.119 DIETARY NONCOMPLIANCE: ICD-10-CM

## 2025-05-21 DIAGNOSIS — E55.9 VITAMIN D DEFICIENCY: ICD-10-CM

## 2025-05-21 DIAGNOSIS — E78.1 HYPERTRIGLYCERIDEMIA: ICD-10-CM

## 2025-05-21 DIAGNOSIS — E78.2 MIXED HYPERLIPIDEMIA: ICD-10-CM

## 2025-05-21 DIAGNOSIS — Z79.4 TYPE 2 DIABETES MELLITUS WITH HYPERGLYCEMIA, WITH LONG-TERM CURRENT USE OF INSULIN (HCC): Primary | ICD-10-CM

## 2025-05-21 DIAGNOSIS — E11.9 TYPE 2 DIABETES MELLITUS WITHOUT COMPLICATION, WITH LONG-TERM CURRENT USE OF INSULIN (HCC): ICD-10-CM

## 2025-05-21 DIAGNOSIS — E53.8 VITAMIN B12 DEFICIENCY: ICD-10-CM

## 2025-05-21 DIAGNOSIS — Z79.4 TYPE 2 DIABETES MELLITUS WITHOUT COMPLICATION, WITH LONG-TERM CURRENT USE OF INSULIN (HCC): ICD-10-CM

## 2025-05-21 DIAGNOSIS — E11.9 TYPE 2 DIABETES MELLITUS WITHOUT COMPLICATION, UNSPECIFIED WHETHER LONG TERM INSULIN USE (HCC): Primary | ICD-10-CM

## 2025-05-21 DIAGNOSIS — I10 ESSENTIAL HYPERTENSION: ICD-10-CM

## 2025-05-21 DIAGNOSIS — E11.65 TYPE 2 DIABETES MELLITUS WITH HYPERGLYCEMIA, WITH LONG-TERM CURRENT USE OF INSULIN (HCC): Primary | ICD-10-CM

## 2025-05-21 PROCEDURE — 3075F SYST BP GE 130 - 139MM HG: CPT | Performed by: CLINICAL NURSE SPECIALIST

## 2025-05-21 PROCEDURE — 99214 OFFICE O/P EST MOD 30 MIN: CPT | Performed by: CLINICAL NURSE SPECIALIST

## 2025-05-21 PROCEDURE — 3044F HG A1C LEVEL LT 7.0%: CPT | Performed by: FAMILY MEDICINE

## 2025-05-21 PROCEDURE — 3079F DIAST BP 80-89 MM HG: CPT | Performed by: CLINICAL NURSE SPECIALIST

## 2025-05-21 PROCEDURE — 95251 CONT GLUC MNTR ANALYSIS I&R: CPT | Performed by: CLINICAL NURSE SPECIALIST

## 2025-05-21 PROCEDURE — 99214 OFFICE O/P EST MOD 30 MIN: CPT | Performed by: FAMILY MEDICINE

## 2025-05-21 PROCEDURE — 3079F DIAST BP 80-89 MM HG: CPT | Performed by: FAMILY MEDICINE

## 2025-05-21 PROCEDURE — 3044F HG A1C LEVEL LT 7.0%: CPT | Performed by: CLINICAL NURSE SPECIALIST

## 2025-05-21 PROCEDURE — 3075F SYST BP GE 130 - 139MM HG: CPT | Performed by: FAMILY MEDICINE

## 2025-05-21 RX ORDER — LISINOPRIL 10 MG/1
10 TABLET ORAL DAILY
Qty: 90 TABLET | Refills: 3 | Status: SHIPPED | OUTPATIENT
Start: 2025-05-21

## 2025-05-21 RX ORDER — OMEGA-3-ACID ETHYL ESTERS 1 G/1
2 CAPSULE, LIQUID FILLED ORAL 2 TIMES DAILY
Qty: 360 CAPSULE | Refills: 3 | Status: SHIPPED | OUTPATIENT
Start: 2025-05-21

## 2025-05-21 RX ORDER — SILDENAFIL 100 MG/1
TABLET, FILM COATED ORAL
COMMUNITY
Start: 2025-02-28

## 2025-05-21 NOTE — PROGRESS NOTES
08:25 AM         Review of Systems  ROS:  Const: Denies chills, fever, malaise and sweats.  Eyes: Denies discharge, pain, redness and visual disturbance.  ENMT: Denies earaches, other ear symptoms. Denies nasal or sinus symptoms other than stated  above. Denies mouth and tongue lesions and sore throat.  CV: Denies chest discomfort, pain; diaphoresis, dizziness, edema, lightheadedness, orthopnea,  palpitations, syncope and near syncopal episode or any exertional symptoms  Resp: Denies  hemoptysis, pleuritic pain, SOB, sputum production and wheezing.  Dry cough  GI: Denies abdominal pain, change in bowel habits, hematochezia, melena, nausea and vomiting.  : Denies urinary symptoms including dysuria , urgency, frequency or hematuria.  Musculo: Denies musculoskeletal symptoms.  Skin: Denies bruising and rash.  Neuro: Denies headache, numbness, stiff neck, tingling and focal weakness slurred speech or facial  droop  Hema/Lymph: Denies bleeding/bruising tendency and enlarged lymph nodes        Current Outpatient Medications:     Insulin Degludec 200 UNIT/ML SOPN, Inject 24 units daily at bedtime, Disp: 10 Adjustable Dose Pre-filled Pen Syringe, Rfl: 2    sildenafil (VIAGRA) 100 MG tablet, TAKE 1/2 TO 1 TABLET BY MOUTH 1 TO 2 HOURS PRIOR TO INTERCOURSE AS NEEDED ON AN EMPTY STOMACH, Disp: , Rfl:     lisinopril (PRINIVIL;ZESTRIL) 10 MG tablet, Take 1 tablet by mouth daily, Disp: 90 tablet, Rfl: 3    omega-3 acid ethyl esters (LOVAZA) 1 g capsule, Take 2 capsules by mouth 2 times daily, Disp: 360 capsule, Rfl: 3    Tirzepatide (MOUNJARO) 10 MG/0.5ML SOAJ pen, Inject 10 mg into the skin once a week, Disp: 2 mL, Rfl: 1    Insulin Pen Needle (BD PEN NEEDLE LILLY 2ND GEN) 32G X 4 MM MISC, USES WITH INSULIN 4 TIMES A DAY, Disp: 400 each, Rfl: 3    metFORMIN (GLUCOPHAGE) 1000 MG tablet, Take 1 tablet by mouth 2 times daily (with meals), Disp: 180 tablet, Rfl: 1    fenofibric acid (TRILIPIX) 135 MG CPDR capsule, Take 1 capsule by

## 2025-05-21 NOTE — PROGRESS NOTES
[facility] Department of Endocrinology Diabetes and Metabolism   [address], [place], [place], [address]   Phone: [phone]  Fax: [phone]    Date of Service: 5/21/2025    Primary Care Physician: Rd Bose MD  Provider: ALISHA Overton - CNS      Reason for the visit:  DM type 2     History of Present Illness:  The history is provided by the patient. No  was used. Accuracy of the patient data is excellent.  Carlitos Das is a very pleasant 55 y.o. male seen today for diabetes management     Carlitos Das was diagnosed with diabetes at age 40 and he is currently on Tresiba 28 units daily at bedtime, Humalog  18 units with meals + ss 3:50>150, Metformin 1000 mg BID, Mounjaro 10 mg weekly   Ozempic was to costly   Attempted invokana but caused mycotic infection   The patient has been checking blood sugar 3-4 times per day   Uses Kamron   Ambulatory continuous glucose monitoring of interstitial tissue fluid via a subcutaneous sensor for a minimum of 72 hours; analysis, interpretation and report  Average sensor glucose 128  Time in range 92%  Hyperglycemia 7%  Hypoglycemia 0%        Lab Results   Component Value Date/Time    LABA1C 6.8 05/20/2025 08:09 AM    LABA1C 7.7 01/10/2025 08:44 AM    LABA1C 7.1 10/09/2024 08:25 AM    LABA1C 7.6 07/15/2024 09:42 AM     Patient has had no hypoglycemic episodes   Since last OV, the patient has been mindful of what has been eating and trying to follow diabetes diet as encouraged   I reviewed current medications and the patient has no issues with diabetes medications  Carlitos Das is up to date with eye exam and denied any history of diabetic retinopathy   The patient performs  own feet care  Microvascular complications:  No Retinopathy, + Nephropathy or Neuropathy   Macrovascular complications: no CAD, PVD, or Stroke  The patient refuses Flushot and pneumonia vaccine   No HX

## 2025-06-09 ENCOUNTER — PATIENT MESSAGE (OUTPATIENT)
Dept: ENDOCRINOLOGY | Age: 55
End: 2025-06-09

## 2025-06-10 RX ORDER — TIRZEPATIDE 12.5 MG/.5ML
12.5 INJECTION, SOLUTION SUBCUTANEOUS WEEKLY
COMMUNITY
End: 2025-06-10 | Stop reason: SDUPTHER

## 2025-06-10 NOTE — TELEPHONE ENCOUNTER
If patient is currently on Mounjaro 10 mg once weekly, please have him increase to 12.5 mg once weekly.  Please send me prescription and I will send

## 2025-06-11 RX ORDER — TIRZEPATIDE 12.5 MG/.5ML
12.5 INJECTION, SOLUTION SUBCUTANEOUS WEEKLY
Qty: 2 ML | Refills: 3 | Status: SHIPPED | OUTPATIENT
Start: 2025-06-11

## 2025-07-07 ENCOUNTER — PATIENT MESSAGE (OUTPATIENT)
Dept: ENDOCRINOLOGY | Age: 55
End: 2025-07-07

## 2025-07-08 NOTE — TELEPHONE ENCOUNTER
If he is doing well on 12.5 mg we can keep him there.  If you would like to increase to the highest dose which is 15 mg we can also do that if needed.  Please let me know what he wants to do

## 2025-08-04 ENCOUNTER — PATIENT MESSAGE (OUTPATIENT)
Dept: ENDOCRINOLOGY | Age: 55
End: 2025-08-04

## 2025-08-05 DIAGNOSIS — E11.65 TYPE 2 DIABETES MELLITUS WITH HYPERGLYCEMIA, WITH LONG-TERM CURRENT USE OF INSULIN (HCC): ICD-10-CM

## 2025-08-05 DIAGNOSIS — Z79.4 TYPE 2 DIABETES MELLITUS WITH HYPERGLYCEMIA, WITH LONG-TERM CURRENT USE OF INSULIN (HCC): ICD-10-CM

## 2025-08-05 RX ORDER — HYDROCHLOROTHIAZIDE 12.5 MG/1
CAPSULE ORAL
Qty: 6 EACH | Refills: 2 | Status: SHIPPED | OUTPATIENT
Start: 2025-08-05

## 2025-08-05 RX ORDER — TIRZEPATIDE 12.5 MG/.5ML
12.5 INJECTION, SOLUTION SUBCUTANEOUS WEEKLY
Qty: 2 ML | Refills: 3 | Status: SHIPPED | OUTPATIENT
Start: 2025-08-05

## 2025-08-15 DIAGNOSIS — E55.9 VITAMIN D DEFICIENCY: ICD-10-CM

## 2025-08-15 DIAGNOSIS — E53.8 VITAMIN B12 DEFICIENCY: ICD-10-CM

## 2025-08-15 DIAGNOSIS — Z79.4 TYPE 2 DIABETES MELLITUS WITH HYPERGLYCEMIA, WITH LONG-TERM CURRENT USE OF INSULIN (HCC): ICD-10-CM

## 2025-08-15 DIAGNOSIS — N52.9 ERECTILE DYSFUNCTION, UNSPECIFIED ERECTILE DYSFUNCTION TYPE: ICD-10-CM

## 2025-08-15 DIAGNOSIS — E78.2 MIXED HYPERLIPIDEMIA: ICD-10-CM

## 2025-08-15 DIAGNOSIS — I10 ESSENTIAL HYPERTENSION: ICD-10-CM

## 2025-08-15 DIAGNOSIS — E11.65 TYPE 2 DIABETES MELLITUS WITH HYPERGLYCEMIA, WITH LONG-TERM CURRENT USE OF INSULIN (HCC): ICD-10-CM

## 2025-08-15 LAB
ALBUMIN: 4.5 G/DL (ref 3.5–5.2)
ALP BLD-CCNC: 87 U/L (ref 40–129)
ALT SERPL-CCNC: 20 U/L (ref 0–50)
ANION GAP SERPL CALCULATED.3IONS-SCNC: 14 MMOL/L (ref 7–16)
AST SERPL-CCNC: 22 U/L (ref 0–50)
BASOPHILS ABSOLUTE: 0.08 K/UL (ref 0–0.2)
BASOPHILS RELATIVE PERCENT: 1 % (ref 0–2)
BILIRUB SERPL-MCNC: 0.2 MG/DL (ref 0–1.2)
BILIRUBIN, URINE: NEGATIVE
BUN BLDV-MCNC: 23 MG/DL (ref 6–20)
CALCIUM SERPL-MCNC: 10.1 MG/DL (ref 8.6–10)
CHLORIDE BLD-SCNC: 102 MMOL/L (ref 98–107)
CHOLESTEROL, TOTAL: 109 MG/DL
CO2: 19 MMOL/L (ref 22–29)
COLOR, UA: YELLOW
COMMENT: NORMAL
CREAT SERPL-MCNC: 1 MG/DL (ref 0.7–1.2)
CREATININE URINE: 158 MG/DL (ref 40–278)
EOSINOPHILS ABSOLUTE: 0.14 K/UL (ref 0.05–0.5)
EOSINOPHILS RELATIVE PERCENT: 2 % (ref 0–6)
FOLATE: 4.3 NG/ML (ref 4.6–34.8)
GFR, ESTIMATED: >90 ML/MIN/1.73M2
GLUCOSE BLD-MCNC: 134 MG/DL (ref 74–99)
GLUCOSE URINE: NEGATIVE MG/DL
HBA1C MFR BLD: 6.4 % (ref 4–5.6)
HCT VFR BLD CALC: 41.3 % (ref 37–54)
HDLC SERPL-MCNC: 35 MG/DL
HEMOGLOBIN: 13.9 G/DL (ref 12.5–16.5)
IMMATURE GRANULOCYTES %: 0 % (ref 0–5)
IMMATURE GRANULOCYTES ABSOLUTE: <0.03 K/UL (ref 0–0.58)
KETONES, URINE: NEGATIVE MG/DL
LDL CHOLESTEROL: 33 MG/DL
LEUKOCYTE ESTERASE, URINE: NEGATIVE
LYMPHOCYTES ABSOLUTE: 2.34 K/UL (ref 1.5–4)
LYMPHOCYTES RELATIVE PERCENT: 36 % (ref 20–42)
MCH RBC QN AUTO: 29.9 PG (ref 26–35)
MCHC RBC AUTO-ENTMCNC: 33.7 G/DL (ref 32–34.5)
MCV RBC AUTO: 88.8 FL (ref 80–99.9)
MICROALBUMIN/CREAT 24H UR: 26 MG/L (ref 0–20)
MICROALBUMIN/CREAT UR-RTO: 16 MCG/MG CREAT (ref 0–30)
MONOCYTES ABSOLUTE: 0.41 K/UL (ref 0.1–0.95)
MONOCYTES RELATIVE PERCENT: 6 % (ref 2–12)
NEUTROPHILS ABSOLUTE: 3.5 K/UL (ref 1.8–7.3)
NEUTROPHILS RELATIVE PERCENT: 54 % (ref 43–80)
NITRITE, URINE: NEGATIVE
PDW BLD-RTO: 12.8 % (ref 11.5–15)
PH, URINE: 6 (ref 5–8)
PLATELET # BLD: 238 K/UL (ref 130–450)
PMV BLD AUTO: 10.9 FL (ref 7–12)
POTASSIUM SERPL-SCNC: 4.5 MMOL/L (ref 3.5–5.1)
PROTEIN UA: NEGATIVE MG/DL
RBC # BLD: 4.65 M/UL (ref 3.8–5.8)
SODIUM BLD-SCNC: 135 MMOL/L (ref 136–145)
SPECIFIC GRAVITY UA: 1.01 (ref 1–1.03)
TOTAL CK: 110 U/L (ref 0–190)
TOTAL PROTEIN: 7.7 G/DL (ref 6.4–8.3)
TRIGL SERPL-MCNC: 205 MG/DL
TSH SERPL DL<=0.05 MIU/L-ACNC: 3.09 UIU/ML (ref 0.27–4.2)
TURBIDITY: CLEAR
URINE HGB: NEGATIVE
UROBILINOGEN, URINE: 0.2 EU/DL (ref 0–1)
VITAMIN B-12: 749 PG/ML (ref 232–1245)
VITAMIN D 25-HYDROXY: 32.8 NG/ML (ref 30–100)
VLDLC SERPL CALC-MCNC: 41 MG/DL
WBC # BLD: 6.5 K/UL (ref 4.5–11.5)

## 2025-08-17 LAB
SEX HORMONE BINDING GLOBULIN: 23 NMOL/L (ref 19–76)
TESTOSTERONE FREE-NONMALE: 65.2 PG/ML (ref 47–244)
TESTOSTERONE TOTAL: 275 NG/DL (ref 193–740)
TESTOSTERONE, BIOAVAILABLE: 152.7 NG/DL (ref 130–680)

## 2025-08-21 ENCOUNTER — OFFICE VISIT (OUTPATIENT)
Dept: PRIMARY CARE CLINIC | Age: 55
End: 2025-08-21
Payer: COMMERCIAL

## 2025-08-21 VITALS
TEMPERATURE: 97.6 F | HEIGHT: 67 IN | WEIGHT: 212 LBS | OXYGEN SATURATION: 97 % | DIASTOLIC BLOOD PRESSURE: 78 MMHG | SYSTOLIC BLOOD PRESSURE: 136 MMHG | HEART RATE: 105 BPM | BODY MASS INDEX: 33.27 KG/M2

## 2025-08-21 DIAGNOSIS — E11.65 TYPE 2 DIABETES MELLITUS WITH HYPERGLYCEMIA, WITH LONG-TERM CURRENT USE OF INSULIN (HCC): ICD-10-CM

## 2025-08-21 DIAGNOSIS — E53.8 FOLIC ACID DEFICIENCY: ICD-10-CM

## 2025-08-21 DIAGNOSIS — Z79.4 TYPE 2 DIABETES MELLITUS WITH HYPERGLYCEMIA, WITH LONG-TERM CURRENT USE OF INSULIN (HCC): ICD-10-CM

## 2025-08-21 DIAGNOSIS — I10 ESSENTIAL HYPERTENSION: ICD-10-CM

## 2025-08-21 DIAGNOSIS — E55.9 VITAMIN D DEFICIENCY: ICD-10-CM

## 2025-08-21 DIAGNOSIS — E53.8 VITAMIN B12 DEFICIENCY: ICD-10-CM

## 2025-08-21 DIAGNOSIS — E78.2 MIXED HYPERLIPIDEMIA: Primary | ICD-10-CM

## 2025-08-21 DIAGNOSIS — E83.52 HYPERCALCEMIA: ICD-10-CM

## 2025-08-21 DIAGNOSIS — E87.1 HYPONATREMIA: ICD-10-CM

## 2025-08-21 PROCEDURE — 3078F DIAST BP <80 MM HG: CPT | Performed by: FAMILY MEDICINE

## 2025-08-21 PROCEDURE — 3044F HG A1C LEVEL LT 7.0%: CPT | Performed by: FAMILY MEDICINE

## 2025-08-21 PROCEDURE — 99214 OFFICE O/P EST MOD 30 MIN: CPT | Performed by: FAMILY MEDICINE

## 2025-08-21 PROCEDURE — 3075F SYST BP GE 130 - 139MM HG: CPT | Performed by: FAMILY MEDICINE

## 2025-08-21 RX ORDER — ATORVASTATIN CALCIUM 40 MG/1
40 TABLET, FILM COATED ORAL DAILY
Qty: 90 TABLET | Refills: 1 | Status: CANCELLED | OUTPATIENT
Start: 2025-08-21

## 2025-08-21 RX ORDER — FENOFIBRIC ACID 135 MG/1
135 CAPSULE, DELAYED RELEASE ORAL DAILY
Qty: 90 CAPSULE | Refills: 1 | Status: CANCELLED | OUTPATIENT
Start: 2025-08-21

## 2025-09-05 DIAGNOSIS — E78.2 MIXED HYPERLIPIDEMIA: ICD-10-CM

## 2025-09-05 DIAGNOSIS — E53.8 FOLIC ACID DEFICIENCY: ICD-10-CM

## 2025-09-05 RX ORDER — FOLIC ACID 1 MG/1
1 TABLET ORAL DAILY
Qty: 90 TABLET | Refills: 3 | Status: CANCELLED | OUTPATIENT
Start: 2025-09-05

## 2025-09-05 RX ORDER — FENOFIBRIC ACID 135 MG/1
135 CAPSULE, DELAYED RELEASE ORAL DAILY
Qty: 90 CAPSULE | Refills: 1 | Status: SHIPPED | OUTPATIENT
Start: 2025-09-05

## (undated) DEVICE — GRADUATE TRIANG MEASURE 1000ML BLK PRNT

## (undated) DEVICE — FORCEPS BX L240CM JAW DIA2.4MM ORNG L CAP W/ NDL DISP RAD

## (undated) DEVICE — SPONGE GZ W4XL4IN RAYON POLY FILL CVR W/ NONWOVEN FAB